# Patient Record
Sex: MALE | Race: WHITE | Employment: OTHER | URBAN - METROPOLITAN AREA
[De-identification: names, ages, dates, MRNs, and addresses within clinical notes are randomized per-mention and may not be internally consistent; named-entity substitution may affect disease eponyms.]

---

## 2017-02-09 PROBLEM — I48.91 ATRIAL FIBRILLATION (HCC): Status: ACTIVE | Noted: 2017-02-09

## 2017-02-09 PROBLEM — M62.830 BACK SPASM: Status: ACTIVE | Noted: 2017-02-09

## 2017-02-09 PROBLEM — B02.29 POST HERPETIC NEURALGIA: Status: ACTIVE | Noted: 2017-02-09

## 2017-02-10 ENCOUNTER — HOSPITAL ENCOUNTER (OUTPATIENT)
Age: 67
Discharge: HOME | End: 2017-02-10
Payer: MEDICARE

## 2017-02-10 DIAGNOSIS — Z11.4 SCREENING FOR HIV (HUMAN IMMUNODEFICIENCY VIRUS): ICD-10-CM

## 2017-02-10 DIAGNOSIS — E78.5 HYPERLIPIDEMIA WITH TARGET LDL LESS THAN 130: Chronic | ICD-10-CM

## 2017-02-10 DIAGNOSIS — Z11.59 NEED FOR HEPATITIS C SCREENING TEST: ICD-10-CM

## 2017-02-10 LAB
ALBUMIN SERPL-MCNC: 4.5 G/DL (ref 3.5–5.2)
ALBUMIN/GLOBULIN RATIO: ABNORMAL (ref 1–2.5)
ALP BLD-CCNC: 71 U/L (ref 40–129)
ALT SERPL-CCNC: 38 U/L (ref 5–41)
ANION GAP SERPL CALCULATED.3IONS-SCNC: 15 MMOL/L (ref 9–17)
AST SERPL-CCNC: 26 U/L
BILIRUB SERPL-MCNC: 1.11 MG/DL (ref 0.3–1.2)
BUN BLDV-MCNC: 17 MG/DL (ref 8–23)
BUN/CREAT BLD: ABNORMAL (ref 9–20)
CALCIUM SERPL-MCNC: 9.5 MG/DL (ref 8.6–10.4)
CHLORIDE BLD-SCNC: 103 MMOL/L (ref 98–107)
CHOLESTEROL/HDL RATIO: 5
CHOLESTEROL: 196 MG/DL
CO2: 24 MMOL/L (ref 20–31)
CREAT SERPL-MCNC: 0.98 MG/DL (ref 0.7–1.2)
GFR AFRICAN AMERICAN: >60 ML/MIN
GFR NON-AFRICAN AMERICAN: >60 ML/MIN
GFR SERPL CREATININE-BSD FRML MDRD: ABNORMAL ML/MIN/{1.73_M2}
GFR SERPL CREATININE-BSD FRML MDRD: ABNORMAL ML/MIN/{1.73_M2}
GLUCOSE BLD-MCNC: 122 MG/DL (ref 70–99)
HDLC SERPL-MCNC: 39 MG/DL
HIV AG/AB: NONREACTIVE
LDL CHOLESTEROL: 104 MG/DL (ref 0–130)
POTASSIUM SERPL-SCNC: 4.3 MMOL/L (ref 3.7–5.3)
SODIUM BLD-SCNC: 142 MMOL/L (ref 135–144)
TOTAL PROTEIN: 7.4 G/DL (ref 6.4–8.3)
TRIGL SERPL-MCNC: 266 MG/DL
VLDLC SERPL CALC-MCNC: ABNORMAL MG/DL (ref 1–30)

## 2017-02-10 PROCEDURE — 80061 LIPID PANEL: CPT

## 2017-02-10 PROCEDURE — 87389 HIV-1 AG W/HIV-1&-2 AB AG IA: CPT

## 2017-02-10 PROCEDURE — 86803 HEPATITIS C AB TEST: CPT

## 2017-02-10 PROCEDURE — 80053 COMPREHEN METABOLIC PANEL: CPT

## 2017-02-10 PROCEDURE — 36415 COLL VENOUS BLD VENIPUNCTURE: CPT

## 2017-02-15 LAB — HEPATITIS C ANTIBODY: NONREACTIVE

## 2017-08-09 PROBLEM — I48.91 ATRIAL FIBRILLATION (HCC): Chronic | Status: ACTIVE | Noted: 2017-02-09

## 2018-02-09 ENCOUNTER — HOSPITAL ENCOUNTER (OUTPATIENT)
Age: 68
Discharge: HOME | End: 2018-02-09
Payer: MEDICARE

## 2018-02-09 DIAGNOSIS — E78.5 HYPERLIPIDEMIA WITH TARGET LDL LESS THAN 130: Chronic | ICD-10-CM

## 2018-02-09 LAB
ALBUMIN SERPL-MCNC: 4.3 G/DL (ref 3.5–5.2)
ALBUMIN/GLOBULIN RATIO: ABNORMAL (ref 1–2.5)
ALP BLD-CCNC: 67 U/L (ref 40–129)
ALT SERPL-CCNC: 47 U/L (ref 5–41)
ANION GAP SERPL CALCULATED.3IONS-SCNC: 11 MMOL/L (ref 9–17)
AST SERPL-CCNC: 36 U/L
BILIRUB SERPL-MCNC: 1.09 MG/DL (ref 0.3–1.2)
BUN BLDV-MCNC: 14 MG/DL (ref 8–23)
BUN/CREAT BLD: ABNORMAL (ref 9–20)
CALCIUM SERPL-MCNC: 9.1 MG/DL (ref 8.6–10.4)
CHLORIDE BLD-SCNC: 102 MMOL/L (ref 98–107)
CHOLESTEROL/HDL RATIO: 6.1
CHOLESTEROL: 227 MG/DL
CO2: 27 MMOL/L (ref 20–31)
CREAT SERPL-MCNC: 0.84 MG/DL (ref 0.7–1.2)
GFR AFRICAN AMERICAN: >60 ML/MIN
GFR NON-AFRICAN AMERICAN: >60 ML/MIN
GFR SERPL CREATININE-BSD FRML MDRD: ABNORMAL ML/MIN/{1.73_M2}
GFR SERPL CREATININE-BSD FRML MDRD: ABNORMAL ML/MIN/{1.73_M2}
GLUCOSE BLD-MCNC: 108 MG/DL (ref 70–99)
HDLC SERPL-MCNC: 37 MG/DL
LDL CHOLESTEROL DIRECT: 125 MG/DL
LDL CHOLESTEROL: ABNORMAL MG/DL (ref 0–130)
POTASSIUM SERPL-SCNC: 4.2 MMOL/L (ref 3.7–5.3)
SODIUM BLD-SCNC: 140 MMOL/L (ref 135–144)
TOTAL PROTEIN: 7.1 G/DL (ref 6.4–8.3)
TRIGL SERPL-MCNC: 466 MG/DL
VLDLC SERPL CALC-MCNC: ABNORMAL MG/DL (ref 1–30)

## 2018-02-09 PROCEDURE — 83721 ASSAY OF BLOOD LIPOPROTEIN: CPT

## 2018-02-09 PROCEDURE — 36415 COLL VENOUS BLD VENIPUNCTURE: CPT

## 2018-02-09 PROCEDURE — 80053 COMPREHEN METABOLIC PANEL: CPT

## 2018-02-09 PROCEDURE — 80061 LIPID PANEL: CPT

## 2019-02-11 ENCOUNTER — HOSPITAL ENCOUNTER (OUTPATIENT)
Age: 69
Discharge: HOME | End: 2019-02-11
Payer: MEDICARE

## 2019-02-11 DIAGNOSIS — E78.5 HYPERLIPIDEMIA WITH TARGET LDL LESS THAN 130: Chronic | ICD-10-CM

## 2019-02-11 DIAGNOSIS — I48.91 ATRIAL FIBRILLATION, UNSPECIFIED TYPE (HCC): Chronic | ICD-10-CM

## 2019-02-11 LAB
ALBUMIN SERPL-MCNC: 4.6 G/DL (ref 3.5–5.2)
ALBUMIN/GLOBULIN RATIO: ABNORMAL (ref 1–2.5)
ALP BLD-CCNC: 71 U/L (ref 40–129)
ALT SERPL-CCNC: 24 U/L (ref 5–41)
ANION GAP SERPL CALCULATED.3IONS-SCNC: 11 MMOL/L (ref 9–17)
AST SERPL-CCNC: 21 U/L
BILIRUB SERPL-MCNC: 0.95 MG/DL (ref 0.3–1.2)
BUN BLDV-MCNC: 15 MG/DL (ref 8–23)
BUN/CREAT BLD: ABNORMAL (ref 9–20)
CALCIUM SERPL-MCNC: 9.6 MG/DL (ref 8.6–10.4)
CHLORIDE BLD-SCNC: 105 MMOL/L (ref 98–107)
CHOLESTEROL/HDL RATIO: 7.2
CHOLESTEROL: 215 MG/DL
CO2: 27 MMOL/L (ref 20–31)
CREAT SERPL-MCNC: 0.98 MG/DL (ref 0.7–1.2)
GFR AFRICAN AMERICAN: >60 ML/MIN
GFR NON-AFRICAN AMERICAN: >60 ML/MIN
GFR SERPL CREATININE-BSD FRML MDRD: ABNORMAL ML/MIN/{1.73_M2}
GFR SERPL CREATININE-BSD FRML MDRD: ABNORMAL ML/MIN/{1.73_M2}
GLUCOSE BLD-MCNC: 113 MG/DL (ref 70–99)
HDLC SERPL-MCNC: 30 MG/DL
LDL CHOLESTEROL DIRECT: 123 MG/DL
LDL CHOLESTEROL: ABNORMAL MG/DL (ref 0–130)
POTASSIUM SERPL-SCNC: 4.4 MMOL/L (ref 3.7–5.3)
SODIUM BLD-SCNC: 143 MMOL/L (ref 135–144)
TOTAL PROTEIN: 7.6 G/DL (ref 6.4–8.3)
TRIGL SERPL-MCNC: 508 MG/DL
VLDLC SERPL CALC-MCNC: ABNORMAL MG/DL (ref 1–30)

## 2019-02-11 PROCEDURE — 80061 LIPID PANEL: CPT

## 2019-02-11 PROCEDURE — 80053 COMPREHEN METABOLIC PANEL: CPT

## 2019-02-11 PROCEDURE — 36415 COLL VENOUS BLD VENIPUNCTURE: CPT

## 2019-02-11 PROCEDURE — 83721 ASSAY OF BLOOD LIPOPROTEIN: CPT

## 2020-02-12 PROBLEM — R94.31 ABNORMAL ELECTROCARDIOGRAM: Status: ACTIVE | Noted: 2017-02-16

## 2020-02-12 PROBLEM — I49.3 PVC'S (PREMATURE VENTRICULAR CONTRACTIONS): Status: ACTIVE | Noted: 2018-04-16

## 2020-02-12 PROBLEM — F33.9 MAJOR DEPRESSION, RECURRENT, CHRONIC (HCC): Status: ACTIVE | Noted: 2017-02-17

## 2020-02-12 PROBLEM — J32.9 CHRONIC SINUSITIS: Status: ACTIVE | Noted: 2018-10-16

## 2020-02-12 PROBLEM — I34.0 MILD MITRAL REGURGITATION: Status: ACTIVE | Noted: 2017-03-13

## 2020-02-12 PROBLEM — F10.11 HISTORY OF ALCOHOL ABUSE: Status: ACTIVE | Noted: 2019-04-23

## 2021-03-16 ENCOUNTER — OFFICE VISIT (OUTPATIENT)
Dept: UROLOGY | Age: 71
End: 2021-03-16
Payer: MEDICARE

## 2021-03-16 VITALS — HEART RATE: 86 BPM | TEMPERATURE: 97.7 F | SYSTOLIC BLOOD PRESSURE: 135 MMHG | DIASTOLIC BLOOD PRESSURE: 78 MMHG

## 2021-03-16 DIAGNOSIS — R97.20 ELEVATED PSA: Primary | ICD-10-CM

## 2021-03-16 PROCEDURE — 99203 OFFICE O/P NEW LOW 30 MIN: CPT | Performed by: UROLOGY

## 2021-03-16 ASSESSMENT — ENCOUNTER SYMPTOMS
BACK PAIN: 0
ABDOMINAL PAIN: 0
EYE REDNESS: 0
COUGH: 0
SHORTNESS OF BREATH: 0
WHEEZING: 0
VOMITING: 0
DIARRHEA: 0
EYE PAIN: 0
CONSTIPATION: 0
NAUSEA: 0

## 2021-03-16 NOTE — LETTER
[address] [address]  Dept: [phone]  Dept Fax: [phone]        3/16/21    Patient: Philip Soto  YOB: 1950    Dear Verner Camel, MD,    I had the pleasure of seeing one of your patients, Marilee Pan today in the office today. Below are the relevant portions of my assessment and plan of care. IMPRESSION:  1. Elevated PSA        PLAN:  He was found to have an elevated PSA recently. He thinks he may have had an infection at that time. We discussed biopsy, but he is reluctant. Will repeat PSA in 3 months. Prescriptions Ordered:  No orders of the defined types were placed in this encounter. Orders Placed:  Orders Placed This Encounter   Procedures    PSA, Diagnostic     Standing Status:   Future     Standing Expiration Date:   3/16/2022         Thank you for allowing me to participate in the care of this patient. I will keep you updated on this patient's follow up and I look forward to serving you and your patients again in the future.         Raudel Ellis MD

## 2021-03-16 NOTE — PROGRESS NOTES
[address] [address]  Dept: [phone]  Dept Fax: [phone] Urology Office Note - New patient    Patient:  Dawson Pereyra  YOB: 1950  Date: 3/16/2021    The patient is a 72 y.o. male who presents todayfor evaluation of the following problems:   Chief Complaint   Patient presents with    New Patient    Elevated PSA    referred by Donita Sanches MD.      HPI  He is here for elevated PSA. His most recent PSA was 5.67. He had some infections in the past, many years ago. No voiding complaints. He has not had an elevated PSA in the past. No FHx of prostate cancer. He did not have any dysuria. (Patient's old records have been requested, reviewed and summarized in today's note.)    Summary of old records: N/A    Additional History: N/A    Procedures Today: N/A    Last several PSA's:  No results found for: PSA  Last total testosterone:  No results found for: TESTOSTERONE  Urinalysis today:  No results found for this visit on 03/16/21. AUA Symptom Score (3/16/2021):   INCOMPLETE EMPTYING: How often have you had the sensation of not emptying your bladder?: Not at all  FREQUENCY: How often do you have to urinate less than every two hours?: Not at all  INTERMITTENCY: How often have you found you stopped and started again several times when you urinated?: Not at all  URGENCY: How often have you found it difficult to postpone urination?: Not at all  WEAK STREAM: How often have you had a weak urinary stream?: Not at all  STRAINING: How often have you had to strain to start  urination?: Not at all  NOCTURIA: How many times did you typically get up at night to uriniate?: NONE  TOTAL I-PSS SCORE[de-identified] 0  How would you feel if you were to spend the rest of your life with your urinary condition?: Pleased    Last BUN and creatinine:  Lab Results   Component Value Date    BUN 15 02/11/2019     Lab on file to calculate BMI. Physical Exam  Constitutional: Patient in no acute distress. Neuro: Alert and oriented to person, place and time. Psych: Mood normal, affect normal  Lungs:Respiratory effort is normal  Cardiovascular: Warm & Pink  Abdomen: Soft, non-tender, non-distendedwith no CVA,  No flank tenderness,  Orhepatosplenomegaly   Lymphatics: No palpable lymphadenopathy. Bladder non-tender and not distended. Musculoskeletal: Normal gait and station  Penis normal and circumcised  Urethral meatus normal  Scrotal exam normal  Testicles normal bilaterally  Epididymis normal bilaterally  No evidence of inguinal hernia  Normal rectal tone with no masses  Prostate:  40 grams, smooth, no nodules. Assessment and Plan      1. Elevated PSA           Plan:        He was found to have an elevated PSA recently. He thinks he may have had an infection at that time. We discussed biopsy, but he is reluctant. Will repeat PSA in 3 months. Prescriptions Ordered:  No orders of the defined types were placed in this encounter. Orders Placed:  Orders Placed This Encounter   Procedures    PSA, Diagnostic     Standing Status:   Future     Standing Expiration Date:   3/16/2022             Preeti Concepcion MD    Agree with the ROS entered by the MA.

## 2021-06-11 ENCOUNTER — HOSPITAL ENCOUNTER (OUTPATIENT)
Age: 71
Discharge: HOME | End: 2021-06-11
Payer: MEDICARE

## 2021-06-11 DIAGNOSIS — R97.20 ELEVATED PSA: ICD-10-CM

## 2021-06-11 LAB — PROSTATE SPECIFIC ANTIGEN: 4.9 UG/L

## 2021-06-11 PROCEDURE — 36415 COLL VENOUS BLD VENIPUNCTURE: CPT

## 2021-06-11 PROCEDURE — 84153 ASSAY OF PSA TOTAL: CPT

## 2021-06-15 ENCOUNTER — OFFICE VISIT (OUTPATIENT)
Dept: UROLOGY | Age: 71
End: 2021-06-15
Payer: MEDICARE

## 2021-06-15 VITALS
TEMPERATURE: 96.2 F | HEART RATE: 69 BPM | SYSTOLIC BLOOD PRESSURE: 152 MMHG | WEIGHT: 209 LBS | DIASTOLIC BLOOD PRESSURE: 98 MMHG | HEIGHT: 73 IN | BODY MASS INDEX: 27.7 KG/M2

## 2021-06-15 DIAGNOSIS — R35.1 NOCTURIA: ICD-10-CM

## 2021-06-15 DIAGNOSIS — R97.20 ELEVATED PSA: Primary | ICD-10-CM

## 2021-06-15 PROCEDURE — 99213 OFFICE O/P EST LOW 20 MIN: CPT | Performed by: UROLOGY

## 2021-06-15 ASSESSMENT — ENCOUNTER SYMPTOMS
EYE PAIN: 0
WHEEZING: 0
VOMITING: 0
SHORTNESS OF BREATH: 0
NAUSEA: 0
CONSTIPATION: 0
COUGH: 0
BACK PAIN: 0
ABDOMINAL PAIN: 0
EYE REDNESS: 0
DIARRHEA: 0

## 2021-06-15 NOTE — PROGRESS NOTES
Review of Systems   Constitutional: Negative for chills, fatigue and fever. Eyes: Negative for pain, redness and visual disturbance. Respiratory: Negative for cough, shortness of breath and wheezing. Cardiovascular: Negative for chest pain and leg swelling. Gastrointestinal: Negative for abdominal pain, constipation, diarrhea, nausea and vomiting. Genitourinary: Positive for frequency. Negative for difficulty urinating, dysuria, flank pain, hematuria, scrotal swelling, testicular pain and urgency. Musculoskeletal: Negative for back pain, joint swelling and myalgias. Skin: Negative for rash and wound. Neurological: Negative for dizziness, tremors and numbness. Hematological: Does not bruise/bleed easily.

## 2021-06-15 NOTE — PROGRESS NOTES
[address] [address]  Dept:  [facility] Urology Office Note - Established    Patient:  Hi Corado  YOB: 1950  Date: 6/15/2021    The patient is a 72 y.o. male who presents todayfor evaluation of the following problems:   Chief Complaint   Patient presents with    Elevated PSA     PSA results    3 Month Follow-Up       HPI  He is here for elevated PSA. He was found to have a PSA of 5.67. He was reluctant to undergo a biopsy at that time. We repeated the PSA, which came down to 4.9. He thought he had an infection at that time. He had not had a PSA for some time. He has no  symptoms at this time. Overall, he is happy with his voiding. Summary of old records: N/A    Additional History: N/A    Procedures Today: N/A    Urinalysis today:  No results found for this visit on 06/15/21. Last several PSA's:  Lab Results   Component Value Date    PSA 4.90 (H) 06/11/2021     Last total testosterone:  No results found for: TESTOSTERONE    AUA Symptom Score (6/15/2021):   INCOMPLETE EMPTYING: How often have you had the sensation of not emptying your bladder?: Not at all  FREQUENCY: How often do you have to urinate less than every two hours?: Not at all  INTERMITTENCY: How often have you found you stopped and started again several times when you urinated?: Almost always  URGENCY: How often have you found it difficult to postpone urination?: Not at all  WEAK STREAM: How often have you had a weak urinary stream?: About Half the time  STRAINING: How often have you had to strain to start  urination?: Not at all  NOCTURIA: How many times did you typically get up at night to uriniate?: 2 Times  TOTAL I-PSS SCORE[de-identified] 10  How would you feel if you were to spend the rest of your life with your urinary condition?: Terrible    Last BUN and creatinine:  Lab Results   Component Value Date    BUN 15 kg/m². Patient is a 72 y.o. male in no acute distress and alert and oriented to person, place and time. Physical Exam  Constitutional: Patient in no acute distress. Neuro: Alert and oriented to person, place and time. Psych: Mood normal, affect normal  Lungs: Respiratory effort is normal  Cardiovascular: Warm & Pink  Abdomen: Soft, non-tender, non-distended with no CVA,  No flank tenderness,  Or hepatosplenomegaly   Lymphatics: No palpablelymphadenopathy. Bladder non-tender and not distended. Musculoskeletal: Normal gait and station      Assessment and Plan      1. Elevated PSA    2. Nocturia           Plan:          His PSA remains elevated, but has improved. He was reluctant to undergo a biopsy. Will follow up in 6 months with a PSA. Restrict fluids before bed. Return in about 6 months (around 12/15/2021) for Labs. Prescriptions Ordered:  No orders of the defined types were placed in this encounter. Orders Placed:  Orders Placed This Encounter   Procedures    PSA, Diagnostic     Standing Status:   Future     Standing Expiration Date:   6/15/2022           Muna Moreno MD    Agree with the ROS entered by the MA.

## 2021-06-15 NOTE — LETTER
[address] [address]  Dept: [phone]  Dept Fax: [phone]        6/15/21    Patient: Florina Alvarado  YOB: 1950    Dear Asiya Jorge MD,    I had the pleasure of seeing one of your patients, Duane Trejo today in the office today. Below are the relevant portions of my assessment and plan of care. IMPRESSION:  1. Elevated PSA    2. Nocturia        PLAN:  His PSA remains elevated, but has improved. He was reluctant to undergo a biopsy. Will follow up in 6 months with a PSA. Restrict fluids before bed. Return in about 6 months (around 12/15/2021) for Labs. Prescriptions Ordered:  No orders of the defined types were placed in this encounter. Orders Placed:  Orders Placed This Encounter   Procedures    PSA, Diagnostic     Standing Status:   Future     Standing Expiration Date:   6/15/2022        Thank you for allowing me to participate in the care of this patient. I will keep you updated on this patient's follow up and I look forward to serving you and your patients again in the future.         Bonnie Stokes MD

## 2021-12-10 ENCOUNTER — HOSPITAL ENCOUNTER (OUTPATIENT)
Age: 71
Discharge: HOME | End: 2021-12-10
Payer: MEDICARE

## 2021-12-10 DIAGNOSIS — R97.20 ELEVATED PSA: ICD-10-CM

## 2021-12-10 PROCEDURE — 84153 ASSAY OF PSA TOTAL: CPT

## 2021-12-10 PROCEDURE — 36415 COLL VENOUS BLD VENIPUNCTURE: CPT

## 2021-12-11 LAB — PROSTATE SPECIFIC ANTIGEN: 5.28 UG/L

## 2021-12-14 ENCOUNTER — OFFICE VISIT (OUTPATIENT)
Dept: UROLOGY | Age: 71
End: 2021-12-14
Payer: MEDICARE

## 2021-12-14 VITALS
DIASTOLIC BLOOD PRESSURE: 90 MMHG | WEIGHT: 199 LBS | SYSTOLIC BLOOD PRESSURE: 142 MMHG | BODY MASS INDEX: 26.37 KG/M2 | HEIGHT: 73 IN | TEMPERATURE: 96.8 F

## 2021-12-14 DIAGNOSIS — R97.20 ELEVATED PSA: Primary | ICD-10-CM

## 2021-12-14 DIAGNOSIS — R35.1 NOCTURIA: ICD-10-CM

## 2021-12-14 PROCEDURE — 99213 OFFICE O/P EST LOW 20 MIN: CPT | Performed by: UROLOGY

## 2021-12-14 ASSESSMENT — ENCOUNTER SYMPTOMS
VOMITING: 0
EYE PAIN: 0
DIARRHEA: 0
ABDOMINAL PAIN: 0
EYE REDNESS: 0
SHORTNESS OF BREATH: 0
COUGH: 0
CONSTIPATION: 0
WHEEZING: 0
BACK PAIN: 0
NAUSEA: 1

## 2021-12-14 NOTE — LETTER
[address] [address]  Dept: [phone]  Dept Fax: [phone]        12/14/21    Patient: Chanda Pretty  YOB: 1950    Dear Flor Alicea MD,    I had the pleasure of seeing one of your patients, Tim Lal today in the office today. Below are the relevant portions of my assessment and plan of care. IMPRESSION:  1. Elevated PSA    2. Nocturia        PLAN:  His PSA was 5.67, and came down, but is now back up. We discussed all of his options. He is not interested in biopsy at this time. We discussed MRI as well. For now, we will move forward with urine ExoDx. Return in about 6 weeks (around 1/25/2022). Prescriptions Ordered:  No orders of the defined types were placed in this encounter. Orders Placed:  No orders of the defined types were placed in this encounter. Thank you for allowing me to participate in the care of this patient. I will keep you updated on this patient's follow up and I look forward to serving you and your patients again in the future.         Juan Alaniz MD

## 2021-12-14 NOTE — PROGRESS NOTES
[address] [address]  Dept: [address] [facility] Urology Office Note - Established    Patient:  Moy Vazquez  YOB: 1950  Date: 12/14/2021    The patient is a 72 y.o. male who presents todayfor evaluation of the following problems:   Chief Complaint   Patient presents with    Follow-up     6 month PSA        HPI  Here for f/u elevated PSA. Found to have elevated psa in February 2021 was 5.67. Repeated in June and came back down to 4.9. PSA is back up to 5.28. He has never had a prostate biopsy. No fhx of prostate CA. He denies voiding complaints. His stream is strong and feels he empties well. No urgency or frequency. He denies hematuria or dysuria. Summary of old records: N/A    Additional History: N/A    Procedures Today: N/A    Urinalysis today:  No results found for this visit on 12/14/21. Last several PSA's:  Lab Results   Component Value Date    PSA 5.28 (H) 12/10/2021    PSA 4.90 (H) 06/11/2021     Last total testosterone:  No results found for: TESTOSTERONE    AUA Symptom Score (12/14/2021):   INCOMPLETE EMPTYING: How often have you had the sensation of not emptying your bladder?: Not at all  FREQUENCY: How often do you have to urinate less than every two hours?: Not at all  INTERMITTENCY: How often have you found you stopped and started again several times when you urinated?: Not at all  URGENCY: How often have you found it difficult to postpone urination?: Not at all  WEAK STREAM: How often have you had a weak urinary stream?: Not at all  STRAINING: How often have you had to strain to start  urination?: Not at all  NOCTURIA: How many times did you typically get up at night to uriniate?: 2 Times  TOTAL I-PSS SCORE[de-identified] 2  How would you feel if you were to spend the rest of your life with your urinary condition?: Delighted    Last BUN and creatinine:  Lab Results   Component Value Date    BUN 15 02/11/2019     Lab Results   Component Value Date    CREATININE 0.98 02/11/2019       Additional Lab/Culture results: none    Imaging Reviewed during this Office Visit: none  (results were independently reviewed by physician and radiology report verified)    PAST MEDICAL, FAMILY AND SOCIAL HISTORY UPDATE:  Past Medical History:   Diagnosis Date    Alcohol abuse 2/9/2016    BMI 26.0-26.9,adult 8/6/2015    Diverticulosis     Hyperlipidemia LDL goal < 130 8/6/2015    Mixed hyperlipidemia 8/6/2015    replace inactive diagnosis    Overweight (BMI 25.0-29. 9) 8/6/2015    Sinusitis      Past Surgical History:   Procedure Laterality Date    COLONOSCOPY      10 YEARS AGO    HERNIA REPAIR      RIGHT INGUINAL     History reviewed. No pertinent family history.   Outpatient Medications Marked as Taking for the 12/14/21 encounter (Office Visit) with Kiki Pena MD   Medication Sig Dispense Refill    atorvastatin (LIPITOR) 80 MG tablet Take 0.5 tablets by mouth nightly 90 tablet 3    escitalopram (LEXAPRO) 10 MG tablet TAKE 1 TABLET DAILY 90 tablet 3    Omega-3 Fatty Acids (FISH OIL) 1000 MG CAPS Take 4 capsules by mouth daily 120 capsule 11    fluticasone (FLONASE) 50 MCG/ACT nasal spray USE 1 SPRAY NASALLY DAILY 48 g 3    Multiple Vitamins-Minerals (CENTRUM MEN PO) Take by mouth daily      apixaban (ELIQUIS) 5 MG TABS tablet Take by mouth 2 times daily      metoprolol tartrate (LOPRESSOR) 50 MG tablet Take 50 mg by mouth 2 times daily         Sulfa antibiotics  Social History     Tobacco Use   Smoking Status Never Smoker   Smokeless Tobacco Never Used     (Ifpatient a smoker, smoking cessation counseling offered)    Social History     Substance and Sexual Activity   Alcohol Use No    Alcohol/week: 0.0 standard drinks    Comment: Stopped drinking Dec. 10th 2016       REVIEW OF SYSTEMS:  Review of Systems    Physical Exam:      Vitals:    12/14/21 1049   BP: (!) 142/90   Temp: 96.8 °F (36 °C) Body mass index is 26.25 kg/m². Patient is a 72 y.o. male in no acute distress and alert and oriented to person, place and time. Physical Exam  Constitutional: Patient in no acute distress. Neuro: Alert and oriented to person, place and time. Psych: Mood normal, affect normal  Lungs: Respiratory effort is normal  Cardiovascular: Warm & Pink  Abdomen: Soft, non-tender, non-distended with no CVA,  No flank tenderness,  Or hepatosplenomegaly   Lymphatics: No palpablelymphadenopathy. Bladder non-tender and not distended. Musculoskeletal: Normal gait and station      Assessment and Plan      1. Elevated PSA    2. Nocturia           Plan:          His PSA was 5.67, and came down, but is now back up. We discussed all of his options. He is not interested in biopsy at this time. We discussed MRI as well. For now, we will move forward with urine ExoDx. Return in about 6 weeks (around 1/25/2022). Prescriptions Ordered:  No orders of the defined types were placed in this encounter. Orders Placed:  No orders of the defined types were placed in this encounter. Juan Alaniz MD    Agree with the ROS entered by the MA.

## 2021-12-14 NOTE — PROGRESS NOTES
Review of Systems   Constitutional: Negative for appetite change, chills and fatigue. Eyes: Negative for pain, redness and visual disturbance. Respiratory: Negative for cough, shortness of breath and wheezing. Cardiovascular: Negative for chest pain and leg swelling. Gastrointestinal: Positive for nausea. Negative for abdominal pain, constipation, diarrhea and vomiting. Genitourinary: Negative for difficulty urinating, dysuria, flank pain, frequency, hematuria and urgency. Musculoskeletal: Negative for back pain, joint swelling and myalgias. Skin: Negative for rash and wound. Neurological: Negative for dizziness, weakness and numbness. Hematological: Bruises/bleeds easily.

## 2022-01-25 ENCOUNTER — TELEPHONE (OUTPATIENT)
Dept: UROLOGY | Age: 72
End: 2022-01-25

## 2022-01-25 ENCOUNTER — HOSPITAL ENCOUNTER (OUTPATIENT)
Age: 72
Setting detail: SPECIMEN
Discharge: HOME | End: 2022-01-25

## 2022-01-25 ENCOUNTER — OFFICE VISIT (OUTPATIENT)
Dept: UROLOGY | Age: 72
End: 2022-01-25
Payer: MEDICARE

## 2022-01-25 VITALS
BODY MASS INDEX: 26.37 KG/M2 | HEIGHT: 73 IN | TEMPERATURE: 95.4 F | SYSTOLIC BLOOD PRESSURE: 130 MMHG | WEIGHT: 199 LBS | DIASTOLIC BLOOD PRESSURE: 80 MMHG

## 2022-01-25 DIAGNOSIS — R35.1 NOCTURIA: ICD-10-CM

## 2022-01-25 DIAGNOSIS — R97.20 ELEVATED PSA: Primary | ICD-10-CM

## 2022-01-25 LAB
FLUOROQUINOLONE RESISTANT ORG, CULT: NORMAL
ZZ NTE WITH NAME CLEAN UP: SPECIMEN SOURCE: NORMAL

## 2022-01-25 PROCEDURE — 99214 OFFICE O/P EST MOD 30 MIN: CPT | Performed by: UROLOGY

## 2022-01-25 ASSESSMENT — ENCOUNTER SYMPTOMS
EYE PAIN: 0
VOMITING: 0
SHORTNESS OF BREATH: 0
EYE REDNESS: 0
BACK PAIN: 1
NAUSEA: 0
ABDOMINAL PAIN: 0
COUGH: 0
WHEEZING: 0
CONSTIPATION: 1
DIARRHEA: 0

## 2022-01-25 NOTE — LETTER
[address] [address]  Dept: [phone]  Dept Fax: [phone]        1/25/22    Patient: Juan C Rodriguez  YOB: 1950    Dear Abdias Blackman MD,    I had the pleasure of seeing one of your patients, Carlos Pederson today in the office today. Below are the relevant portions of my assessment and plan of care. IMPRESSION:  1. Elevated PSA    2. Nocturia        PLAN:  PSA is elevated. ExoDx is slightly elevated. We discussed MRI, but he is a bit claustrophobic. Will move forward with prostate biopsy. Return in about 4 weeks (around 2/22/2022) for biopsy. Prescriptions Ordered:  No orders of the defined types were placed in this encounter. Orders Placed:  No orders of the defined types were placed in this encounter. Thank you for allowing me to participate in the care of this patient. I will keep you updated on this patient's follow up and I look forward to serving you and your patients again in the future.         Queta Mcknight MD

## 2022-01-25 NOTE — PROGRESS NOTES
[address] [facility] [address]  Dept:  [facility] Urology Office Note - Established    Patient:  Chad Peters  YOB: 1950  Date: 1/25/2022    The patient is a 80 y.o. male who presents todayfor evaluation of the following problems:   Chief Complaint   Patient presents with    Follow-up     6 week ExoDx       HPI  He is here in follow up for elevated PSA. He had an ExoDx, which was slightly elevated at 23. His PSA had been trending up. He feels like he has shingles at the moment. Summary of old records: N/A    Additional History: N/A    Procedures Today: N/A    Urinalysis today:  No results found for this visit on 01/25/22. Last several PSA's:  Lab Results   Component Value Date    PSA 5.28 (H) 12/10/2021    PSA 4.90 (H) 06/11/2021     Last total testosterone:  No results found for: TESTOSTERONE    AUA Symptom Score (1/25/2022):   INCOMPLETE EMPTYING: How often have you had the sensation of not emptying your bladder?: Not at all  FREQUENCY: How often do you have to urinate less than every two hours?: Not at all  INTERMITTENCY: How often have you found you stopped and started again several times when you urinated?: Less than 1 to 5 times  URGENCY: How often have you found it difficult to postpone urination?: Not at all  WEAK STREAM: How often have you had a weak urinary stream?: More than Half the time  STRAINING: How often have you had to strain to start  urination?: Not at all  NOCTURIA: How many times did you typically get up at night to uriniate?: 2 Times  TOTAL I-PSS SCORE[de-identified] 7  How would you feel if you were to spend the rest of your life with your urinary condition?: Pleased    Last BUN and creatinine:  Lab Results   Component Value Date    BUN 15 02/11/2019     Lab Results   Component Value Date    CREATININE 0.98 02/11/2019       Additional Lab/Culture results: none    Imaging Reviewed during this Office Visit: none    (results were independently reviewed by physician and radiology report verified)    PAST MEDICAL, FAMILY AND SOCIAL HISTORY UPDATE:  Past Medical History:   Diagnosis Date    Alcohol abuse 2/9/2016    BMI 26.0-26.9,adult 8/6/2015    Diverticulosis     Hyperlipidemia LDL goal < 130 8/6/2015    Mixed hyperlipidemia 8/6/2015    replace inactive diagnosis    Overweight (BMI 25.0-29. 9) 8/6/2015    Sinusitis      Past Surgical History:   Procedure Laterality Date    COLONOSCOPY      10 YEARS AGO    HERNIA REPAIR      RIGHT INGUINAL     No family history on file. Outpatient Medications Marked as Taking for the 1/25/22 encounter (Office Visit) with Cristóbal Webb MD   Medication Sig Dispense Refill    atorvastatin (LIPITOR) 80 MG tablet Take 0.5 tablets by mouth nightly 90 tablet 3    escitalopram (LEXAPRO) 10 MG tablet TAKE 1 TABLET DAILY 90 tablet 3    Omega-3 Fatty Acids (FISH OIL) 1000 MG CAPS Take 4 capsules by mouth daily 120 capsule 11    fluticasone (FLONASE) 50 MCG/ACT nasal spray USE 1 SPRAY NASALLY DAILY 48 g 3    Multiple Vitamins-Minerals (CENTRUM MEN PO) Take by mouth daily      apixaban (ELIQUIS) 5 MG TABS tablet Take by mouth 2 times daily      metoprolol tartrate (LOPRESSOR) 50 MG tablet Take 50 mg by mouth 2 times daily         Sulfa antibiotics  Social History     Tobacco Use   Smoking Status Never Smoker   Smokeless Tobacco Never Used     (Ifpatient a smoker, smoking cessation counseling offered)    Social History     Substance and Sexual Activity   Alcohol Use No    Alcohol/week: 0.0 standard drinks    Comment: Stopped drinking Dec. 10th 2016       REVIEW OF SYSTEMS:  Review of Systems    Physical Exam:      Vitals:    01/25/22 1117   BP: 130/80   Temp: 95.4 °F (35.2 °C)     Body mass index is 26.25 kg/m².   Patient is a 80 y.o. male in no acute distress and alert and oriented to person, place and time.  Physical Exam  Constitutional: Patient in no acute distress. Neuro: Alert and oriented to person, place and time. Psych: Mood normal, affect normal  Lungs: Respiratory effort is normal  Cardiovascular: Warm & Pink  Abdomen: Soft, non-tender, non-distended with no CVA,  No flank tenderness,  Or hepatosplenomegaly   Lymphatics: No palpablelymphadenopathy. Bladder non-tender and not distended. Musculoskeletal: Normal gait and station      Assessment and Plan      1. Elevated PSA    2. Nocturia           Plan:          PSA is elevated. ExoDx is slightly elevated. We discussed MRI, but he is a bit claustrophobic. Will move forward with prostate biopsy. Return in about 4 weeks (around 2/22/2022) for biopsy. Prescriptions Ordered:  No orders of the defined types were placed in this encounter. Orders Placed:  No orders of the defined types were placed in this encounter. Lili Turner MD    Agree with the ROS entered by the MA.

## 2022-01-25 NOTE — TELEPHONE ENCOUNTER
Patient is scheduled for prostate BX on 02/15. PA is needed. Patient states that he is also on Blood thinners.

## 2022-01-25 NOTE — PROGRESS NOTES
Review of Systems   Constitutional: Negative for chills, fatigue and fever. Eyes: Negative for pain, redness and visual disturbance. Respiratory: Negative for cough, shortness of breath and wheezing. Cardiovascular: Negative for chest pain and leg swelling. Gastrointestinal: Positive for constipation. Negative for abdominal pain, diarrhea, nausea and vomiting. Genitourinary: Negative for difficulty urinating, dysuria, flank pain, frequency, hematuria and urgency. Musculoskeletal: Positive for back pain. Negative for joint swelling and myalgias. Skin: Positive for rash. Negative for wound. Neurological: Negative for dizziness, tremors and numbness. Hematological: Does not bruise/bleed easily. Review of Systems PT has Catheter   Constitutional: Negative for chills, fatigue and fever. Eyes: Negative for pain, redness and visual disturbance. Respiratory: Negative for cough, shortness of breath and wheezing. Cardiovascular: Negative for chest pain and leg swelling. Gastrointestinal: Negative for abdominal pain, constipation, diarrhea, nausea and vomiting. Genitourinary: Negative for difficulty urinating, dysuria, flank pain, frequency, hematuria, scrotal swelling, testicular pain and urgency. Musculoskeletal: Negative for back pain, joint swelling and myalgias. Skin: Negative for rash and wound. Neurological: Negative for dizziness, tremors and numbness. Hematological: Does not bruise/bleed easily.

## 2022-01-26 DIAGNOSIS — R97.20 ELEVATED PSA: ICD-10-CM

## 2022-01-31 RX ORDER — CIPROFLOXACIN 500 MG/1
500 TABLET, FILM COATED ORAL 2 TIMES DAILY
Qty: 6 TABLET | Refills: 0 | Status: SHIPPED | OUTPATIENT
Start: 2022-01-31 | End: 2022-02-03

## 2022-01-31 NOTE — PROGRESS NOTES
Per Dr. Seema Bonilla protocol.  Cipro 500 mg BID for three days starting the day before procedure sent to the . Adiel Henning was notified and pharmacy was confirmed.

## 2022-02-02 ENCOUNTER — TELEPHONE (OUTPATIENT)
Dept: UROLOGY | Age: 72
End: 2022-02-02

## 2022-02-15 ENCOUNTER — HOSPITAL ENCOUNTER (OUTPATIENT)
Age: 72
Setting detail: SPECIMEN
Discharge: HOME | End: 2022-02-15

## 2022-02-15 ENCOUNTER — PROCEDURE VISIT (OUTPATIENT)
Dept: UROLOGY | Age: 72
End: 2022-02-15
Payer: MEDICARE

## 2022-02-15 VITALS — HEIGHT: 73 IN | TEMPERATURE: 95.8 F | BODY MASS INDEX: 26.37 KG/M2 | WEIGHT: 199 LBS

## 2022-02-15 DIAGNOSIS — R97.20 ELEVATED PSA: Primary | ICD-10-CM

## 2022-02-15 PROCEDURE — 76872 US TRANSRECTAL: CPT | Performed by: UROLOGY

## 2022-02-15 PROCEDURE — 55700 PR BIOPSY OF PROSTATE,NEEDLE/PUNCH: CPT | Performed by: UROLOGY

## 2022-02-15 NOTE — PROGRESS NOTES
Elevated PSA:  Patient is here today for history of an elevated PSA. PROSTATE U/S AND BIOPSY  Risks and Benefits discussed with patient prior to procedure. As per my instructions, the patient took an antibiotic 1 hour prior to this procedure. He also had a Fleets enema. Surgeon: Maxwell Yoo 2/15/22  Indications for exam: Elevated PSA  Anesthesia: 1% Lidocaine periprostatic block bilaterally at junction of base of prostate and seminal vesicle. Ultrasound Findings:  Seminal Vesicles: intact bilaterally  Prostate Measurement: 36 grams  Central Zone: Normal echogenic structure  Transitional Zone: Normal echogenic structure  Peripheral Zone: Normal echogenic structure  Bladder: Minimal postvoid residual  Biopsy: Ultrasound guidance used to obtain biopsy cores were taken from each lobe in a sequential fashion. From 6 quadrants all were taken from the right 6 quadrants were taken from the left. All specimens were sent for pathological examination. Biopsy is transrectal with transrectal US done    Postop medications: Cipro 500 mg po bid for 3 more days. Recommendations: Patient has appointment in the office to review results. Postop Prostate Biopsy Instructions:  Patient told to drink plenty of fluids and to take it easy the day of the prostate biopsy. He was encouraged to use sitz baths as needed for relief of rectal discomfort after the biopsy. He was told he may notice blood or a rust color in his semen for several months after the biopsy. He was told to avoid resuming blood thinners or aspirin until the rectal bleeding or visible blood in urine has stopped for at least 48 hours. He should take his antibiotics to completion as prescribed. He was instructed to call our office immediately or to go to the Emergency Room if he experiences a fever over 101, shaking chills or an inability to urinate. Agree with the ROS entered by the MA.

## 2022-02-17 LAB — SURGICAL PATHOLOGY REPORT: NORMAL

## 2022-02-22 ENCOUNTER — OFFICE VISIT (OUTPATIENT)
Dept: UROLOGY | Age: 72
End: 2022-02-22
Payer: MEDICARE

## 2022-02-22 VITALS
TEMPERATURE: 96.3 F | RESPIRATION RATE: 17 BRPM | HEIGHT: 73 IN | DIASTOLIC BLOOD PRESSURE: 68 MMHG | BODY MASS INDEX: 26.77 KG/M2 | WEIGHT: 202 LBS | SYSTOLIC BLOOD PRESSURE: 122 MMHG | HEART RATE: 65 BPM

## 2022-02-22 DIAGNOSIS — R97.20 ELEVATED PSA: Primary | ICD-10-CM

## 2022-02-22 DIAGNOSIS — C61 PROSTATE CANCER (HCC): ICD-10-CM

## 2022-02-22 PROCEDURE — 99214 OFFICE O/P EST MOD 30 MIN: CPT | Performed by: UROLOGY

## 2022-02-22 ASSESSMENT — ENCOUNTER SYMPTOMS
NAUSEA: 0
SHORTNESS OF BREATH: 0
VOMITING: 0
COUGH: 0
DIARRHEA: 0
WHEEZING: 0
ABDOMINAL PAIN: 0
EYE PAIN: 0
BACK PAIN: 0
CONSTIPATION: 0

## 2022-02-22 NOTE — LETTER
[address] [address]  Dept: [phone]  Dept Fax: [phone]        2/22/22    Patient: John Aguilar  YOB: 1950    Dear Allie Sun MD,    I had the pleasure of seeing one of your patients, Jackson Aceves today in the office today. Below are the relevant portions of my assessment and plan of care. IMPRESSION:  1. Elevated PSA    2. Prostate cancer [facility])        PLAN:  He did well after his biopsy  Path was consistent with [place] 7 and 8 disease. Will move forward with CT and bone scan to rule out met disease. We discussed surgery and XRT. He is interested in XRT. Will discuss again after met eval is complete. Return in about 2 weeks (around 3/8/2022). Prescriptions Ordered:  No orders of the defined types were placed in this encounter. Orders Placed:  Orders Placed This Encounter   Procedures    NM BONE SCAN WHOLE BODY     Standing Status:   Future     Standing Expiration Date:   2/23/2023    CT ABDOMEN PELVIS W IV CONTRAST Additional Contrast? None     Standing Status:   Future     Standing Expiration Date:   2/22/2023     Order Specific Question:   Additional Contrast?     Answer:   None     Order Specific Question:   STAT Creatinine as needed:     Answer:   No        Thank you for allowing me to participate in the care of this patient. I will keep you updated on this patient's follow up and I look forward to serving you and your patients again in the future.         Cristóbal Webb MD

## 2022-02-28 ENCOUNTER — HOSPITAL ENCOUNTER (OUTPATIENT)
Dept: NUCLEAR MEDICINE | Age: 72
Discharge: HOME | End: 2022-03-02
Payer: MEDICARE

## 2022-02-28 ENCOUNTER — HOSPITAL ENCOUNTER (OUTPATIENT)
Dept: CT IMAGING | Age: 72
Discharge: HOME | End: 2022-03-02
Payer: MEDICARE

## 2022-02-28 DIAGNOSIS — C61 PROSTATE CANCER (HCC): ICD-10-CM

## 2022-02-28 LAB
GFR NON-AFRICAN AMERICAN: >60 ML/MIN
GFR SERPL CREATININE-BSD FRML MDRD: >60 ML/MIN
GFR SERPL CREATININE-BSD FRML MDRD: NORMAL ML/MIN/{1.73_M2}
POC CREATININE: 1.03 MG/DL (ref 0.51–1.19)

## 2022-02-28 PROCEDURE — 2580000003 HC RX 258: Performed by: UROLOGY

## 2022-02-28 PROCEDURE — 82565 ASSAY OF CREATININE: CPT

## 2022-02-28 PROCEDURE — A9503 TC99M MEDRONATE: HCPCS | Performed by: UROLOGY

## 2022-02-28 PROCEDURE — 6360000004 HC RX CONTRAST MEDICATION: Performed by: UROLOGY

## 2022-02-28 PROCEDURE — 3430000000 HC RX DIAGNOSTIC RADIOPHARMACEUTICAL: Performed by: UROLOGY

## 2022-02-28 PROCEDURE — 78306 BONE IMAGING WHOLE BODY: CPT

## 2022-02-28 PROCEDURE — 74177 CT ABD & PELVIS W/CONTRAST: CPT

## 2022-02-28 RX ORDER — TC 99M MEDRONATE 20 MG/10ML
25 INJECTION, POWDER, LYOPHILIZED, FOR SOLUTION INTRAVENOUS
Status: COMPLETED | OUTPATIENT
Start: 2022-02-28 | End: 2022-02-28

## 2022-02-28 RX ORDER — 0.9 % SODIUM CHLORIDE 0.9 %
80 INTRAVENOUS SOLUTION INTRAVENOUS ONCE
Status: COMPLETED | OUTPATIENT
Start: 2022-02-28 | End: 2022-02-28

## 2022-02-28 RX ORDER — SODIUM CHLORIDE 0.9 % (FLUSH) 0.9 %
10 SYRINGE (ML) INJECTION PRN
Status: DISCONTINUED | OUTPATIENT
Start: 2022-02-28 | End: 2022-03-03 | Stop reason: HOSPADM

## 2022-02-28 RX ADMIN — TC 99M MEDRONATE 28.2 MILLICURIE: 20 INJECTION, POWDER, LYOPHILIZED, FOR SOLUTION INTRAVENOUS at 11:05

## 2022-02-28 RX ADMIN — IOPAMIDOL 75 ML: 755 INJECTION, SOLUTION INTRAVENOUS at 11:26

## 2022-02-28 RX ADMIN — SODIUM CHLORIDE 80 ML: 9 INJECTION, SOLUTION INTRAVENOUS at 11:26

## 2022-02-28 RX ADMIN — Medication 10 ML: at 11:05

## 2022-02-28 RX ADMIN — SODIUM CHLORIDE, PRESERVATIVE FREE 10 ML: 5 INJECTION INTRAVENOUS at 11:27

## 2022-03-08 ENCOUNTER — OFFICE VISIT (OUTPATIENT)
Dept: UROLOGY | Age: 72
End: 2022-03-08
Payer: MEDICARE

## 2022-03-08 VITALS
DIASTOLIC BLOOD PRESSURE: 74 MMHG | TEMPERATURE: 95.3 F | HEIGHT: 73 IN | WEIGHT: 202 LBS | BODY MASS INDEX: 26.77 KG/M2 | HEART RATE: 65 BPM | SYSTOLIC BLOOD PRESSURE: 132 MMHG

## 2022-03-08 DIAGNOSIS — C61 PROSTATE CANCER (HCC): Primary | ICD-10-CM

## 2022-03-08 DIAGNOSIS — R97.20 ELEVATED PSA: Primary | ICD-10-CM

## 2022-03-08 DIAGNOSIS — C61 PROSTATE CANCER (HCC): ICD-10-CM

## 2022-03-08 PROCEDURE — 99214 OFFICE O/P EST MOD 30 MIN: CPT | Performed by: UROLOGY

## 2022-03-08 ASSESSMENT — ENCOUNTER SYMPTOMS
RESPIRATORY NEGATIVE: 1
EYE REDNESS: 0
GASTROINTESTINAL NEGATIVE: 1
DIARRHEA: 0
NAUSEA: 0
SHORTNESS OF BREATH: 0
ABDOMINAL PAIN: 0
EYES NEGATIVE: 1
CONSTIPATION: 0
COUGH: 0
EYE PAIN: 0
BACK PAIN: 0
VOMITING: 0
WHEEZING: 0

## 2022-03-08 NOTE — LETTER
[address] [address]  Dept: [phone]  Dept Fax: [phone]        3/8/22    Patient: Kaylee Hernandez  YOB: 1950    Dear Kmi Corley MD,    I had the pleasure of seeing one of your patients, Drake Dillon today in the office today. Below are the relevant portions of my assessment and plan of care. IMPRESSION:  1. Elevated PSA    2. Prostate cancer [facility])        PLAN:  He is here in follow up for prostate cancer. He has high risk disease. Met eval is negative. Would recommend referral to rad/Onc. No follow-ups on file. Prescriptions Ordered:  No orders of the defined types were placed in this encounter. Orders Placed:  No orders of the defined types were placed in this encounter. Thank you for allowing me to participate in the care of this patient. I will keep you updated on this patient's follow up and I look forward to serving you and your patients again in the future.         Moreno Tipton MD

## 2022-03-08 NOTE — PROGRESS NOTES
Review of Systems   Constitutional: Negative. Negative for appetite change, chills and fatigue. Eyes: Negative. Negative for pain, redness and visual disturbance. Respiratory: Negative. Negative for cough, shortness of breath and wheezing. Cardiovascular: Negative. Negative for chest pain and leg swelling. Gastrointestinal: Negative. Negative for abdominal pain, constipation, diarrhea, nausea and vomiting. Genitourinary: Positive for hematuria. Negative for difficulty urinating, dysuria, flank pain, frequency and urgency. Musculoskeletal: Negative. Negative for back pain, joint swelling and myalgias. Skin: Negative. Negative for rash and wound. Neurological: Negative. Negative for dizziness, weakness and numbness. Hematological: Negative. Does not bruise/bleed easily.

## 2022-03-08 NOTE — PROGRESS NOTES
[address] [facility] [address]  Dept: [address] [facility] [place] Urology Office Note - Established    Patient:  Claudette Rao  YOB: 1950  Date: 3/8/2022    The patient is a 80 y.o. male who presents todayfor evaluation of the following problems:   Chief Complaint   Patient presents with    Follow-up     2 week f/u    Results     CT and Bone scan       HPI  Here to review CT and bone scan. Was found to have sergio 7 & 8 on TRUS bx. PSA at time of diagnosis was 5.28.   CT and bone scan negative for mets. He did have hematuria following biopsy, improving. No dysuria, flank pain, or fevers. He is not interested in surgery at this time. Summary of old records: N/A    Additional History: N/A    Procedures Today: N/A    Urinalysis today:  No results found for this visit on 03/08/22. Last several PSA's:  Lab Results   Component Value Date    PSA 5.28 (H) 12/10/2021    PSA 4.90 (H) 06/11/2021     Last total testosterone:  No results found for: TESTOSTERONE    AUA Symptom Score (3/8/2022): Last BUN and creatinine:  Lab Results   Component Value Date    BUN 15 02/11/2019     Lab Results   Component Value Date    CREATININE 1.03 02/28/2022       Additional Lab/Culture results: none    Imaging Reviewed during this Office Visit: CT and bone scan images reviewed. (results were independently reviewed by physician and radiology report verified)    PAST MEDICAL, FAMILY AND SOCIAL HISTORY UPDATE:  Past Medical History:   Diagnosis Date    Alcohol abuse 2/9/2016    BMI 26.0-26.9,adult 8/6/2015    Diverticulosis     Hyperlipidemia LDL goal < 130 8/6/2015    Mixed hyperlipidemia 8/6/2015    replace inactive diagnosis    Overweight (BMI 25.0-29. 9) 8/6/2015    Sinusitis      Past Surgical History:   Procedure Laterality Date    COLONOSCOPY      10 YEARS AGO    HERNIA REPAIR      RIGHT INGUINAL     No family history on file. Outpatient Medications Marked as Taking for the 3/8/22 encounter (Office Visit) with Natalie Bee MD   Medication Sig Dispense Refill    atorvastatin (LIPITOR) 80 MG tablet Take 0.5 tablets by mouth nightly 90 tablet 3    escitalopram (LEXAPRO) 10 MG tablet TAKE 1 TABLET DAILY 90 tablet 3    Omega-3 Fatty Acids (FISH OIL) 1000 MG CAPS Take 4 capsules by mouth daily 120 capsule 11    fluticasone (FLONASE) 50 MCG/ACT nasal spray USE 1 SPRAY NASALLY DAILY 48 g 3    Multiple Vitamins-Minerals (CENTRUM MEN PO) Take by mouth daily      apixaban (ELIQUIS) 5 MG TABS tablet Take by mouth 2 times daily      metoprolol tartrate (LOPRESSOR) 50 MG tablet Take 50 mg by mouth 2 times daily         Sulfa antibiotics  Social History     Tobacco Use   Smoking Status Never Smoker   Smokeless Tobacco Never Used     (Ifpatient a smoker, smoking cessation counseling offered)    Social History     Substance and Sexual Activity   Alcohol Use No    Alcohol/week: 0.0 standard drinks    Comment: Stopped drinking Dec. 10th 2016       REVIEW OF SYSTEMS:  Review of Systems    Physical Exam:      Vitals:    03/08/22 1408   Temp: 95.3 °F (35.2 °C)     Body mass index is 26.65 kg/m². Patient is a 80 y.o. male in no acute distress and alert and oriented to person, place and time. Physical Exam  Constitutional: Patient in no acute distress. Neuro: Alert and oriented to person, place and time. Psych: Mood normal, affect normal  Lungs: Respiratory effort is normal  Cardiovascular: Warm & Pink  Abdomen: Soft, non-tender, non-distended with no CVA,  No flank tenderness,  Or hepatosplenomegaly   Lymphatics: No palpablelymphadenopathy. Bladder non-tender and not distended. Musculoskeletal: Normal gait and station      Assessment and Plan      1. Elevated PSA    2. Prostate cancer [facility])           Plan:          He is here in follow up for prostate cancer.    He has high risk disease. Met eval is negative. Would recommend referral to rad/Onc. No follow-ups on file. Prescriptions Ordered:  No orders of the defined types were placed in this encounter. Orders Placed:  No orders of the defined types were placed in this encounter. Lambert Medrano MD    Agree with the ROS entered by the MA.

## 2022-03-09 ENCOUNTER — TELEPHONE (OUTPATIENT)
Dept: RADIATION ONCOLOGY | Age: 72
End: 2022-03-09

## 2022-03-09 NOTE — TELEPHONE ENCOUNTER
Referral in work Q for radiation consult. I called pt to schedule, no answer, I left voicemail messg for pt to c/b to schedule consult w/Dr. Damien Diggs.

## 2022-03-15 ENCOUNTER — HOSPITAL ENCOUNTER (OUTPATIENT)
Dept: RADIATION ONCOLOGY | Age: 72
Discharge: HOME | End: 2022-03-15
Payer: MEDICARE

## 2022-03-15 VITALS
RESPIRATION RATE: 18 BRPM | TEMPERATURE: 96.1 F | WEIGHT: 208.6 LBS | SYSTOLIC BLOOD PRESSURE: 164 MMHG | BODY MASS INDEX: 27.65 KG/M2 | OXYGEN SATURATION: 98 % | DIASTOLIC BLOOD PRESSURE: 93 MMHG | HEART RATE: 74 BPM | HEIGHT: 73 IN

## 2022-03-15 DIAGNOSIS — C61 MALIGNANT NEOPLASM OF PROSTATE (HCC): Primary | ICD-10-CM

## 2022-03-15 PROCEDURE — 99205 OFFICE O/P NEW HI 60 MIN: CPT | Performed by: RADIOLOGY

## 2022-03-15 PROCEDURE — 99214 OFFICE O/P EST MOD 30 MIN: CPT | Performed by: RADIOLOGY

## 2022-03-15 NOTE — CONSULTS
[address]            Radiation Oncology          [address] [facility]          [address] [facility] [address]        Leslie Cintron: [phone]        F: [phone]       [email or web address]           3/15/2022    Patient: Deniz Banegas   YOB: 1950       Dear Dr Villarreal Men: Thank you for referring Deniz Banegas to me for evaluation. Below are the relevant portions of my assessment and plan of care. If you have questions, please do not hesitate to call me. I look forward to following this patient along with you. Sincerely,    Electronically signed by Loco Ji MD on 3/15/22 at 11:04 AM EDT    CC: Patient Care Team:  Shemar Lancaster MD as PCP - General (Family Medicine)  Shemar Lancaster MD as PCP - [facility] Provider  ------------------------------------------------------------------------------------------------------------------------------------------------------------------------------------------      RADIATION ONCOLOGY NEW PATIENT VISIT:    Date of Service: 3/15/2022    Location:  [address] [place] [address],   [address] [place] [address]., [facility]   [phone]     Patient ID:   Deniz Banegas  : 1950   MRN: [id number]    CHIEF COMPLAINT: \"newly diagnosed prostate cancer \"    DIAGNOSIS: high risk prostate cancer [id number], PSA 5.28, [place] 4+4 (3/12 cores, 1 core 4+4)     HISTORY OF PRESENT ILLNESS:   Deniz Banegas is a 80 y.o. male who presents for consultation of radiotherapy treatment options of the above  Diagnosis. PSA was 4.9 on 21 and peyton to 5.28 on 12/10/21. He saw his urologist who recommended a TRUS guided biopsy of the prostate. Pathology from 2/15/22 revealed 3 out of 12 cores positive for adenocarcinoma the prostate. He had 1 core 3+4, 1 core of 4+4 (50% max  Involvement), and 1 core of 4+3 (60-70% max involvement) all on the right base/mid. Prostate was measured to be 36 g on ultrasound.     CT abdomen pelvis and bone scan done on 2/28/2022 revealed no evidence of disease. He spoke to urology about treatments and he does not want surgery. He was referred to radiation oncology. He is not on any medications for his bladder. He denies any significant dysuria, hematuria, urgency, weak stream, or problems with his bowels. He is never had radiation therapy before. He does not have a cardiac implanted device. He is never had a heart attack before. He does not have significant ED. AUA Score:  8    PRIOR RADIATION HISTORY:  No prior history of radiation therapy    PACEMAKER: None    PAST MEDICAL HISTORY:  Past Medical History:   Diagnosis Date    A-fib ([facility] [address]     Alcohol abuse 2/9/2016    BMI 26.0-26.9,adult 8/6/2015    Diverticulosis     Hyperlipidemia LDL goal < 130 8/6/2015    Hypertension     Mixed hyperlipidemia 8/6/2015    replace inactive diagnosis    Overweight (BMI 25.0-29. 9) 8/6/2015    Sinusitis        PAST SURGICAL HISTORY:  Past Surgical History:   Procedure Laterality Date    COLONOSCOPY      10 YEARS AGO    HERNIA REPAIR      RIGHT INGUINAL       MEDICATIONS:    Current Outpatient Medications:     atorvastatin (LIPITOR) 80 MG tablet, Take 0.5 tablets by mouth nightly, Disp: 90 tablet, Rfl: 3    escitalopram (LEXAPRO) 10 MG tablet, TAKE 1 TABLET DAILY, Disp: 90 tablet, Rfl: 3    Omega-3 Fatty Acids (FISH OIL) 1000 MG CAPS, Take 4 capsules by mouth daily, Disp: 120 capsule, Rfl: 11    fluticasone (FLONASE) 50 MCG/ACT nasal spray, USE 1 SPRAY NASALLY DAILY, Disp: 48 g, Rfl: 3    Multiple Vitamins-Minerals (CENTRUM MEN PO), Take by mouth daily, Disp: , Rfl:     apixaban (ELIQUIS) 5 MG TABS tablet, Take by mouth 2 times daily, Disp: , Rfl:     metoprolol tartrate (LOPRESSOR) 50 MG tablet, Take 50 mg by mouth 2 times daily, Disp: , Rfl:     ALLERGIES:   Allergies   Allergen Reactions    Sulfa Antibiotics        SOCIAL HISTORY:  Social History     Socioeconomic History    Marital status:      Spouse name: None    Number of children: None    Years of education: None    Highest education level: None   Occupational History    None   Tobacco Use    Smoking status: Never Smoker    Smokeless tobacco: Never Used   Substance and Sexual Activity    Alcohol use: Yes     Alcohol/week: 0.0 standard drinks     Comment: per pt he binge drinks on the weekends     Drug use: Not Currently     Types: Marijuana Deboraha Sangagandeep)    Sexual activity: None   Other Topics Concern    None   Social History Narrative    None     Social Determinants of Health     Financial Resource Strain:     Difficulty of Paying Living Expenses: Not on file   Food Insecurity:     Worried About Running Out of Food in the Last Year: Not on file    Kun of Food in the Last Year: Not on file   Transportation Needs:     Lack of Transportation (Medical): Not on file    Lack of Transportation (Non-Medical): Not on file   Physical Activity:     Days of Exercise per Week: Not on file    Minutes of Exercise per Session: Not on file   Stress:     Feeling of Stress : Not on file   Social Connections:     Frequency of Communication with Friends and Family: Not on file    Frequency of Social Gatherings with Friends and Family: Not on file    Attends [employer] Services: Not on file    Active Member of [address] [place] [address] Positive Networks or Organizations: Not on file    Attends Club or Organization Meetings: Not on file    Marital Status: Not on file   Intimate Partner Violence:     Fear of Current or Ex-Partner: Not on file    Emotionally Abused: Not on file    Physically Abused: Not on file    Sexually Abused: Not on file   Housing Stability:     Unable to Pay for Housing in the Last Year: Not on file    Number of [place] in the Last Year: Not on file    Unstable Housing in the Last Year: Not on file       FAMILY HISTORY:  History reviewed. No pertinent family history.     REVIEW OF SYSTEMS:    GENERAL/CONSTITUTIONAL: The patient denies fever, fatigue, weakness, weight gain or weight loss. HEENT: The patient denies pain, redness, loss of vision, double or blurred vision. The patient denies ringing in the ears, loss of hearing, hoarseness, trouble swallowing, or painful swallowing. CARDIOVASCULAR: The patient denies chest pain, irregular heartbeats, sudden changes in heartbeat or palpitation. RESPIRATORY: The patient denies shortness of breath, cough, hemoptysis. GASTROINTESTINAL: The patient denies nausea, vomiting, diarrhea, constipation, blood in the stools. GENITOURINARY: The patient denies difficult urination, pain or burning with urination, blood in the urine. MUSCULOSKELETAL: The patient denies arm, buttock, thigh or calf cramps. No joint or muscle pain. SKIN: The patient denies easy bruising, skin redness, skin rash, hives. NEUROLOGIC: The patient denies headache, dizziness, fainting, muscle spasm, loss of consciousness. ENDOCRINE: The patient denies intolerance to hot or cold temperature, flushing. HEMATOLOGIC/LYMPHATIC: The patient denies anemia, bleeding tendency or clotting tendency. ALLERGIC/IMMUNOLOGIC: The patient denies rhinitis, asthma, skin sensitivity. PYSCHOLOGIC: The patient denies any depression, anxiety, or confusion. A full 14 point review of systems was performed and assessed and found to be negative except as noted above. PHYSICAL EXAMINATION:    CHAPERONE: Not Required    ECO Asymptomatic    VITAL SIGNS: BP (!) 164/93   Pulse 74   Temp 96.1 °F (35.6 °C) (Temporal)   Resp 18   Ht 6' 1\" (1.854 m)   Wt 208 lb 9.6 oz (94.6 kg)   SpO2 98%   BMI 27.52 kg/m²   GENERAL:  General appearance is that of a well-nourished, well-developed in no apparent distress. HEENT: Normocephalic, atraumatic, EOMI, moist mucosa, no erythema. Indirect exam .  NECK:  No adenopathy or a palpable thyroid mass, trachea is midline. LYMPHATICS: No cervical, supraclavicular, or axillary adenopathy.   HEART: Normal rate and regular rhythm, normal heart sounds. LUNGS:  Pulmonary effort normal. Normal breath sounds. ABDOMEN:  Soft, nontender, non distended, and no hepatosplenomegaly. EXTREMITIES:  No edema. No calf tenderness. MSK:  No spinal tenderness. Normal ROM. NEUROLOGICAL: Alert and oriented. Strength and sensation intact bilaterally. No focal deficits. PSYCH: Mood normal, behavior normal.  SKIN: No erythema, no desquamation. RECTAL: Deferred      LABS:  WBC   Date Value Ref Range Status   02/10/2016 7.3 3.5 - 11.0 k/uL Final     Segs Absolute   Date Value Ref Range Status   02/10/2016 3.90 1.3 - 9.1 k/uL Final     Hemoglobin   Date Value Ref Range Status   02/10/2016 16.8 13.5 - 17.5 g/dL Final     Platelets   Date Value Ref Range Status   02/10/2016 209 150 - 450 k/uL Final     No results found for: , CEA  No results found for:   PSA   Date Value Ref Range Status   12/10/2021 5.28 (H) <4.1 ug/L Final     Comment:     The Roche \"ECLIA\" assay is used. Results obtained with different assay methods cannot be   used interchangeably. 06/11/2021 4.90 (H) <4.1 ug/L Final     Comment:     The Roche \"ECLIA\" assay is used. Results obtained with different assay methods cannot be   used interchangeably. IMAGING:   CT abdomne/pelvis and bone scan 2/28/22 reviewed and noted above. PATHOLOGY:  TRUS guided biopsy report 2/15/22 reviewed and noted above. ASSESSMENT AND PLAN:   David Barry is a 80 y.o. male  With very low volume high risk prostate cancer [place] 4+4, PSA 5.28, [id number] who does not want surgery and presents for consultation radiotherapy treatment options. Technically he does have high risk disease because there was 1 core of [place] 4+4. However I told him it is a very low volume amount of high risk cancer and he has no other high risk features that we know of right now.   Generally for high risk cancer I recommend radiation therapy with 2 to 3 years of androgen deprivation therapy. The radiation would be 44 treatments as well. However, having 1 out of 12 cores with high risk cancer in a PSA under 10 indicates to me that he could be more of a unfavorable intermediate risk treatment candidate. The reason is important because I told him he could do may be 6 to 12 months of ADT instead of 2 to 3 years and the radiation treatments would only be 28 fractions instead of 44. I told him I do think he does need treatment as he does have a core of 4+4 disease, the question is how aggressive we are with the volume of his disease coupled with his age and lack of significant bladder or bowel problems and other comorbidities. At length I discussed indications, risks, benefits of radiation and short-term versus long-term androgen deprivation therapy. After all questions were answered he expressed understanding of his potential diagnosis, prognosis, my recommendations. I told him I had like to get an MRI of his prostate before making my final recommendations. If the MRI shows extracapsular extension, SV involvement, or suspicious lymph nodes, and I be more inclined to treat him as a high risk patient. However, if there is high risk features are not present, then I would feel better about treating him more like an intermediate risk patient. He is agreeable to the plan. I put an order in for MRI prostate to be done next available. I will see him back after the scan is done and to go over it and make my final recommendations. He was agreeable to this. Patient was in agreement with my recommendations. All questions were answered to their satisfaction. Patient was advised to contact us anytime should they have any questions or concerns. I spent greater than 65 minutes counseling and evaluating the different treatment options available to the patient and formulating a plan of action today.       Judie Wong MD MD  Electronically signed by Judie Wong MD on 3/15/22 at 11:04 AM EDT      Drugs Prescribed:  New Prescriptions    No medications on file       Orders Placed:     Orders Placed This Encounter   Procedures    MRI PROSTATE W WO CONTRAST         CC:  Patient Care Team:  Senia Ledesma MD as PCP - General (Family Medicine)  Senia Ledesma MD as PCP - [facility]

## 2022-03-15 NOTE — PROGRESS NOTES
Referring Physician: Dr. Gareth Tineo:    03/15/22 1056   BP: (!) 164/93   Pulse: 74   Resp: 18   Temp: 96.1 °F (35.6 °C)   SpO2: 98%    :  Patient Currently in Pain: No             Wt Readings from Last 1 Encounters:   03/15/22 208 lb 9.6 oz (94.6 kg)        Body mass index is 27.52 kg/m². Height: 6' 1\" (185.4 cm)         Immunizations:    Influenza status:    [x]   Current   []   Patient declined    Pneumococcal status:  [x]   Current  []   Patient declined  Covid status:   [x]  Dose #1:                     [x]  Dose #2:     [x]  Booster:               []  Patient declined    Smoking Status:    [] Smoker - PPD:   [] Nonsmoker - Quit Date:               [x] Never a smoker      No chief complaint on file. Cancer Staging  No matching staging information was found for the patient. Prior Radiation Therapy? No   If yes, site treated:   Facility:                             Date:    Concurrent Chemo/radiation? No   If yes, start date:    Prior Chemotherapy? No   If yes    Facility:                             Date:    Prior Hormonal Therapy or Contraceptive Medications? No   If yes,  Medication:                                Duration:    Head and Neck Cancer? No   If yes, please remind physician to place swallow study order and speech therapy order. Pacemaker/Defibulator/ICD:  No    Do you have any musculoskeletal diseases, Lupus or arthritic conditions? No   If yes, are you currently taking Methotrexate?   []  Yes  []  No           BREAST/GYN  Patient only:     LMP:    Age at first Menses:    Para:    :    Cup size:        PROSTATE patient only :    Oral hormone replacement therapy []  Yes  []  No            Current Outpatient Medications   Medication Sig Dispense Refill    atorvastatin (LIPITOR) 80 MG tablet Take 0.5 tablets by mouth nightly 90 tablet 3    escitalopram (LEXAPRO) 10 MG tablet TAKE 1 TABLET DAILY 90 tablet 3    Omega-3 Fatty Acids (FISH OIL) 1000 MG CAPS Take 4 capsules by mouth daily 120 capsule 11    fluticasone (FLONASE) 50 MCG/ACT nasal spray USE 1 SPRAY NASALLY DAILY 48 g 3    Multiple Vitamins-Minerals (CENTRUM MEN PO) Take by mouth daily      apixaban (ELIQUIS) 5 MG TABS tablet Take by mouth 2 times daily      metoprolol tartrate (LOPRESSOR) 50 MG tablet Take 50 mg by mouth 2 times daily       No current facility-administered medications for this encounter. Past Medical History:   Diagnosis Date    Alcohol abuse 2/9/2016    BMI 26.0-26.9,adult 8/6/2015    Diverticulosis     Hyperlipidemia LDL goal < 130 8/6/2015    Mixed hyperlipidemia 8/6/2015    replace inactive diagnosis    Overweight (BMI 25.0-29. 9) 8/6/2015    Sinusitis        Past Surgical History:   Procedure Laterality Date    COLONOSCOPY      10 YEARS AGO    HERNIA REPAIR      RIGHT INGUINAL       No family history on file. Social History     Socioeconomic History    Marital status:      Spouse name: Not on file    Number of children: Not on file    Years of education: Not on file    Highest education level: Not on file   Occupational History    Not on file   Tobacco Use    Smoking status: Never Smoker    Smokeless tobacco: Never Used   Substance and Sexual Activity    Alcohol use: No     Alcohol/week: 0.0 standard drinks     Comment: Stopped drinking Dec. 10th 2016    Drug use: Not on file    Sexual activity: Not on file   Other Topics Concern    Not on file   Social History Narrative    Not on file     Social Determinants of Health     Financial Resource Strain:     Difficulty of Paying Living Expenses: Not on file   Food Insecurity:     Worried About [address] in the Last Year: Not on file    Kun of Food in the Last Year: Not on file   Transportation Needs:     Lack of Transportation (Medical): Not on file    Lack of Transportation (Non-Medical):  Not on file   Physical Activity:     Days of Exercise per Week: Not on file    Minutes of Exercise per Session: Not on file Stress:     Feeling of Stress : Not on file   Social Connections:     Frequency of Communication with Friends and Family: Not on file    Frequency of Social Gatherings with Friends and Family: Not on file    Attends Sabianist Services: Not on file    Active Member of Clubs or Organizations: Not on file    Attends Club or Organization Meetings: Not on file    Marital Status: Not on file   Intimate Partner Violence:     Fear of Current or Ex-Partner: Not on file    Emotionally Abused: Not on file    Physically Abused: Not on file    Sexually Abused: Not on file   Housing Stability:     Unable to Pay for Housing in the Last Year: Not on file    Number of [place] in the Last Year: Not on file    Unstable Housing in the Last Year: Not on file             FALLS RISK SCREEN  Instructions:  Assess the patient and enter the appropriate indicators that are present for fall risk identification. Total the numbers entered and assign a fall risk score from Table 2.  Reassess patient at a minimum every 12 weeks or with status change. Assessment   Date  3/15/2022     1. Mental Ability: confusion/cognitively impaired 0     2. Elimination Issues: incontinence, frequency 0       3. Ambulatory: use of assistive devices (walker, cane, off-loading devices),        attached to equipment (IV pole, oxygen) 0     4. Sensory Limitations: dizziness, vertigo, impaired vision 0     5. Age less than 65        0     6. Age 72 or greater 1     7. Medication: diuretics, strong analgesics, hypnotics, sedatives,        antihypertensive agents 3   8. Falls:  recent history of falls within the last 3 months (not to include slipping or        tripping) 0   TOTAL 4    If score of 4 or greater was education given? Yes       TABLE 2   Risk Score Risk Level Plan of Care   0-3 Little or  No Risk 1. Provide assistance as indicated for ambulation activities  2. Reorient confused/cognitively impaired patient  3.   Call-light/bell within patient's reach  4. Chair/bed in low position, stretcher/bed with siderails up except when performing patient care activities  5. Educate patient/family/caregiver on falls prevention  6.  Reassess in 12 weeks or with any noted change in patient condition which places them at a risk for a fall   4-6 Moderate Risk 1. Provide assistance as indicated for ambulation activities  2. Reorient confused/cognitively impaired patient  3. Call-light/bell within patient's reach  4. Chair/bed in low position, stretcher/bed with siderails up except when performing patient care activities  5. Educate patient/family/caregiver on falls prevention     7 or   Higher High Risk 1. Place patient in easily observable treatment room  2. Patient attended at all times by family member or staff  3. Provide assistance as indicated for ambulation activities  4. Reorient confused/cognitively impaired patient  5. Call-light/bell within patient's reach  6. Chair/bed in low position, stretcher/bed with siderails up except when performing patient care activities  7. Educate patient/family/caregiver on falls prevention             Assessment/Plan: Patient was seen today for consultation. Here to discuss RT treatment for prostate cancer. Dr. Miranda Douglas updated and examined pt. Per MD pt will have an MRI and will follow up after for review and decide on treatment plan. Alfonso Young assisted with scheduling scan and follow up.         Omid Allen RN 3/15/2022 11:01 AM

## 2022-03-22 ENCOUNTER — HOSPITAL ENCOUNTER (OUTPATIENT)
Dept: MRI IMAGING | Age: 72
Discharge: HOME | End: 2022-03-24
Payer: MEDICARE

## 2022-03-22 DIAGNOSIS — C61 MALIGNANT NEOPLASM OF PROSTATE (HCC): ICD-10-CM

## 2022-03-22 PROCEDURE — 72197 MRI PELVIS W/O & W/DYE: CPT

## 2022-03-22 PROCEDURE — 6360000004 HC RX CONTRAST MEDICATION: Performed by: RADIOLOGY

## 2022-03-22 PROCEDURE — 82565 ASSAY OF CREATININE: CPT

## 2022-03-22 PROCEDURE — 2580000003 HC RX 258: Performed by: RADIOLOGY

## 2022-03-22 PROCEDURE — A9579 GAD-BASE MR CONTRAST NOS,1ML: HCPCS | Performed by: RADIOLOGY

## 2022-03-22 RX ORDER — 0.9 % SODIUM CHLORIDE 0.9 %
40 INTRAVENOUS SOLUTION INTRAVENOUS ONCE
Status: COMPLETED | OUTPATIENT
Start: 2022-03-22 | End: 2022-03-22

## 2022-03-22 RX ORDER — SODIUM CHLORIDE 0.9 % (FLUSH) 0.9 %
10 SYRINGE (ML) INJECTION PRN
Status: DISCONTINUED | OUTPATIENT
Start: 2022-03-22 | End: 2022-03-25 | Stop reason: HOSPADM

## 2022-03-22 RX ADMIN — SODIUM CHLORIDE 40 ML: 9 INJECTION, SOLUTION INTRAVENOUS at 21:30

## 2022-03-22 RX ADMIN — SODIUM CHLORIDE, PRESERVATIVE FREE 10 ML: 5 INJECTION INTRAVENOUS at 21:30

## 2022-03-22 RX ADMIN — GADOTERIDOL 18 ML: 279.3 INJECTION, SOLUTION INTRAVENOUS at 21:30

## 2022-03-23 ENCOUNTER — HOSPITAL ENCOUNTER (OUTPATIENT)
Dept: RADIATION ONCOLOGY | Age: 72
Discharge: HOME | End: 2022-03-23
Payer: MEDICARE

## 2022-03-23 VITALS
HEART RATE: 81 BPM | SYSTOLIC BLOOD PRESSURE: 147 MMHG | RESPIRATION RATE: 16 BRPM | DIASTOLIC BLOOD PRESSURE: 87 MMHG | BODY MASS INDEX: 27.26 KG/M2 | WEIGHT: 206.6 LBS | TEMPERATURE: 96.9 F | OXYGEN SATURATION: 100 %

## 2022-03-23 PROCEDURE — 99212 OFFICE O/P EST SF 10 MIN: CPT | Performed by: RADIOLOGY

## 2022-03-23 PROCEDURE — 99213 OFFICE O/P EST LOW 20 MIN: CPT | Performed by: RADIOLOGY

## 2022-03-23 NOTE — PROGRESS NOTES
[facility]            Radiation Oncology          [address] [facility]          [place], [address]        Deb [place]ard: [phone]        F: [phone]       [email or web address]         Date of Service: 3/23/2022     Location:  [facility],   [address] [facility] omicPattie Perdomo   [phone]        [address] [place] [address] FOLLOW UP NOTE    Patient ID:   Dora Glasgow  : 1950   MRN: [id number]    DIAGNOSIS: low volume HR prostate cancer GS 4+4 (1/3 cores), PSA 5.2, [id number]      INTERVAL HISTORY:   Dora Glasgow is a 80 y.o.. male presents for short follow up of the above diagnosis. MRI prostate done 3/22/22 revealed no definite evidence of extraprostatic extension and no suspicious lymph nodes with disease involvement. He's back today to discuss treatment options. He has no issues with bladder or bowels. MEDICATIONS:    Current Outpatient Medications:     atorvastatin (LIPITOR) 80 MG tablet, Take 0.5 tablets by mouth nightly, Disp: 90 tablet, Rfl: 3    escitalopram (LEXAPRO) 10 MG tablet, TAKE 1 TABLET DAILY, Disp: 90 tablet, Rfl: 3    Omega-3 Fatty Acids (FISH OIL) 1000 MG CAPS, Take 4 capsules by mouth daily, Disp: 120 capsule, Rfl: 11    fluticasone (FLONASE) 50 MCG/ACT nasal spray, USE 1 SPRAY NASALLY DAILY, Disp: 48 g, Rfl: 3    Multiple Vitamins-Minerals (CENTRUM MEN PO), Take by mouth daily, Disp: , Rfl:     apixaban (ELIQUIS) 5 MG TABS tablet, Take by mouth 2 times daily, Disp: , Rfl:     metoprolol tartrate (LOPRESSOR) 50 MG tablet, Take 50 mg by mouth 2 times daily, Disp: , Rfl:   No current facility-administered medications for this encounter.     Facility-Administered Medications Ordered in Other Encounters:     sodium chloride flush 0.9 % injection 10 mL, 10 mL, IntraVENous, PRN, Nena Deluna MD, 10 mL at 22    ALLERGIES:  Allergies   Allergen Reactions    Sulfa Antibiotics          REVIEW OF SYSTEMS: A full 14 point review of systems was performed and assessed and found to be negative except as noted above. PHYSICAL EXAMINATION:    CHAPERONE: Not Required    ECO Asymptomatic    VITAL SIGNS: BP (!) 147/87   Pulse 81   Temp 96.9 °F (36.1 °C) (Temporal)   Resp 16   Wt 206 lb 9.6 oz (93.7 kg)   SpO2 100%   BMI 27.26 kg/m²   GENERAL:  General appearance is that of a well-nourished, well-developed in no apparent distress. HEENT: Normocephalic, atraumatic, EOMI, moist mucosa, no erythema. Indirect exam .  NECK:  No adenopathy or a palpable thyroid mass, trachea is midline. LYMPHATICS: No cervical, supraclavicular, or axillary adenopathy. HEART:  Normal rate and regular rhythm, normal heart sounds. LUNGS:  Pulmonary effort normal. Normal breath sounds. ABDOMEN:  Soft, nontender, non distended, and no hepatosplenomegal.  EXTREMITIES:  No edema. No calf tenderness. MSK:  No spinal tenderness. Normal ROM. NEUROLOGICAL: Alert and oriented. Strength and sensation intact bilaterally. No focal deficits. PSYCH: Mood normal, behavior normal.  SKIN: No erythema, no desquamation. LABS:  WBC   Date Value Ref Range Status   02/10/2016 7.3 3.5 - 11.0 k/uL Final     Segs Absolute   Date Value Ref Range Status   02/10/2016 3.90 1.3 - 9.1 k/uL Final     Hemoglobin   Date Value Ref Range Status   02/10/2016 16.8 13.5 - 17.5 g/dL Final     Platelets   Date Value Ref Range Status   02/10/2016 209 150 - 450 k/uL Final     No results found for: , CEA  PSA   Date Value Ref Range Status   12/10/2021 5.28 (H) <4.1 ug/L Final     Comment:     The Roche \"ECLIA\" assay is used. Results obtained with different assay methods cannot be   used interchangeably. 2021 4.90 (H) <4.1 ug/L Final     Comment:     The Roche \"ECLIA\" assay is used. Results obtained with different assay methods cannot be   used interchangeably.          IMAGING:   MRI prostate 3/22/22 reviewed and noted above       ASSESSMENT AND PLAN:  Raeford Apley is a 80 y.o. male with low volume HR prostate cancer with an MRI showing no evidence of extraprostatic extension/lymph nodes presents for follow up to discuss treatment options. He doesn't have any high risk features on MRI and so per our conversation a few weeks ago, I think it wouldn't be unreasonable to be treated more like unfavorable IR disease as compared to typical HR treatment. I recommend a hypofractionated course 70Gy/28fx with consideration of ADT (6-12 months v 2-3 years). The indications, risks, and benefits were discussed at length, and at the end of our conversation he expressed understanding and agreement of the plan. He would like to proceed with radiation alone in this situation and I told him that's okay as long as he understands the pros/cons of it. He assured me he did. He signed consent and will undergo CT simulation next available and we will aim to start his treatments in a timely manner. Patient was in agreement with my recommendations. All questions were answered to their satisfaction. Patient was advised to contact us anytime should they have any questions or concerns. Electronically signed by Cameron Glover MD on 3/23/2022 at 2:20 PM        Medications Prescribed:   New Prescriptions    No medications on file       Orders: No orders of the defined types were placed in this encounter.       CC:  Patient Care Team:  Hilario Don MD as PCP - General (Family Medicine)  Hilario Don [facility] as [facility]

## 2022-03-23 NOTE — PROGRESS NOTES
John Lobe  3/23/2022  1:58 PM      There were no vitals filed for this visit. :                Wt Readings from Last 1 Encounters:   03/15/22 208 lb 9.6 oz (94.6 kg)                Current Outpatient Medications:     atorvastatin (LIPITOR) 80 MG tablet, Take 0.5 tablets by mouth nightly, Disp: 90 tablet, Rfl: 3    escitalopram (LEXAPRO) 10 MG tablet, TAKE 1 TABLET DAILY, Disp: 90 tablet, Rfl: 3    Omega-3 Fatty Acids (FISH OIL) 1000 MG CAPS, Take 4 capsules by mouth daily, Disp: 120 capsule, Rfl: 11    fluticasone (FLONASE) 50 MCG/ACT nasal spray, USE 1 SPRAY NASALLY DAILY, Disp: 48 g, Rfl: 3    Multiple Vitamins-Minerals (CENTRUM MEN PO), Take by mouth daily, Disp: , Rfl:     apixaban (ELIQUIS) 5 MG TABS tablet, Take by mouth 2 times daily, Disp: , Rfl:     metoprolol tartrate (LOPRESSOR) 50 MG tablet, Take 50 mg by mouth 2 times daily, Disp: , Rfl:   No current facility-administered medications for this encounter. Facility-Administered Medications Ordered in Other Encounters:     sodium chloride flush 0.9 % injection 10 mL, 10 mL, IntraVENous, PRN, Liang Hoff MD, 10 mL at 03/22/22 2130               FALLS RISK SCREEN  Instructions:  Assess the patient and enter the appropriate indicators that are present for fall risk identification. Total the numbers entered and assign a fall risk score from Table 2.  Reassess patient at a minimum every 12 weeks or with status change. Assessment   Date  3/23/2022     1. Mental Ability: confusion/cognitively impaired 0     2. Elimination Issues: incontinence, frequency 0       3. Ambulatory: use of assistive devices (walker, cane, off-loading devices),        attached to equipment (IV pole, oxygen) 0     4. Sensory Limitations: dizziness, vertigo, impaired vision 0     5. Age less than 65        0     6. Age 72 or greater 1     7. Medication: diuretics, strong analgesics, hypnotics, sedatives,        antihypertensive agents 0   8.   Falls:  recent history of falls within the last 3 months (not to include slipping or        tripping) 0   TOTAL 1    If score of 4 or greater was education given? No           TABLE 2   Risk Score Risk Level Plan of Care   0-3 Little or  No Risk 1. Provide assistance as indicated for ambulation activities  2. Reorient confused/cognitively impaired patient  3. Chair/bed in low position, stretcher/bed with siderails up except when performing patient care activities  5. Educate patient/family/caregiver on falls prevention  6.  Reassess in 12 weeks or with any noted change in patient condition which places them at a risk for a fall   4-6 Moderate Risk 1. Provide assistance as indicated for ambulation activities  2. Reorient confused/cognitively impaired patient  3. Chair/bed in low position, stretcher/bed with siderails up except when performing patient care activities  4. Educate patient/family/caregiver on falls prevention     7 or   Higher High Risk 1. Place patient in easily observable treatment room  2. Patient attended at all times by family member or staff  3. Provide assistance as indicated for ambulation activities  4. Reorient confused/cognitively impaired patient  5. Chair/bed in low position, stretcher/bed with siderails up except when performing patient care activities  6. Educate patient/family/caregiver on falls prevention         PLAN: Patient is seen today in follow up. MRI prostate from yesterday to be reviewed by Dr. Miranda Douglas.         Isis Dorantes RN

## 2022-04-08 ENCOUNTER — HOSPITAL ENCOUNTER (OUTPATIENT)
Dept: RADIATION ONCOLOGY | Age: 72
Discharge: HOME | End: 2022-04-08
Attending: RADIOLOGY
Payer: MEDICARE

## 2022-04-08 PROCEDURE — 77334 RADIATION TREATMENT AID(S): CPT | Performed by: RADIOLOGY

## 2022-04-08 PROCEDURE — 77263 THER RADIOLOGY TX PLNG CPLX: CPT | Performed by: RADIOLOGY

## 2022-04-08 NOTE — PROGRESS NOTES
Pt here today for teach and simulation scan. Radiation and You information provided along with additional education regarding radiation therapy and what to expect. Pt verbalizes understanding and all questions answered to the best of my knowledge. Samples of aquaphor and community resource information provided. Pt escorted to CT room without any difficulty. Patient verbalized understanding of site specific side effects of radiation to his prostate (28 fractions). Patient aware to arrive with a comfortable full bladder for daily treatment.

## 2022-04-12 ENCOUNTER — TELEPHONE (OUTPATIENT)
Dept: ONCOLOGY | Age: 72
End: 2022-04-12

## 2022-04-12 NOTE — TELEPHONE ENCOUNTER
[address] [facility] [place] [facility] Nutrition Note    PGSGA scoring tool reviewed with score <4. Automatic nutrition assessment consult populates from PGSGA tool for scores > 4. Will continue to follow as requested.     WAQAR Pérez, RD, LD  Registered Dietitian   [facility]  [phone]

## 2022-04-18 ENCOUNTER — HOSPITAL ENCOUNTER (OUTPATIENT)
Dept: RADIATION ONCOLOGY | Age: 72
Discharge: HOME | End: 2022-04-18
Attending: RADIOLOGY
Payer: MEDICARE

## 2022-04-18 PROCEDURE — 77300 RADIATION THERAPY DOSE PLAN: CPT | Performed by: RADIOLOGY

## 2022-04-18 PROCEDURE — 77338 DESIGN MLC DEVICE FOR IMRT: CPT | Performed by: RADIOLOGY

## 2022-04-18 PROCEDURE — 77301 RADIOTHERAPY DOSE PLAN IMRT: CPT | Performed by: RADIOLOGY

## 2022-04-19 PROCEDURE — 77336 RADIATION PHYSICS CONSULT: CPT | Performed by: RADIOLOGY

## 2022-04-20 ENCOUNTER — HOSPITAL ENCOUNTER (OUTPATIENT)
Dept: RADIATION ONCOLOGY | Age: 72
Discharge: HOME | End: 2022-04-20
Attending: RADIOLOGY
Payer: MEDICARE

## 2022-04-20 PROCEDURE — 77385 HC NTSTY MODUL RAD TX DLVR SMPL: CPT | Performed by: RADIOLOGY

## 2022-04-20 PROCEDURE — 77336 RADIATION PHYSICS CONSULT: CPT | Performed by: RADIOLOGY

## 2022-04-20 PROCEDURE — G6002 STEREOSCOPIC X-RAY GUIDANCE: HCPCS | Performed by: RADIOLOGY

## 2022-04-21 ENCOUNTER — HOSPITAL ENCOUNTER (OUTPATIENT)
Dept: RADIATION ONCOLOGY | Age: 72
Discharge: HOME | End: 2022-04-21
Attending: RADIOLOGY
Payer: MEDICARE

## 2022-04-21 PROCEDURE — 77385 HC NTSTY MODUL RAD TX DLVR SMPL: CPT | Performed by: RADIOLOGY

## 2022-04-21 PROCEDURE — G6002 STEREOSCOPIC X-RAY GUIDANCE: HCPCS | Performed by: RADIOLOGY

## 2022-04-22 ENCOUNTER — HOSPITAL ENCOUNTER (OUTPATIENT)
Dept: RADIATION ONCOLOGY | Age: 72
Discharge: HOME | End: 2022-04-22
Attending: RADIOLOGY
Payer: MEDICARE

## 2022-04-22 PROCEDURE — 77385 HC NTSTY MODUL RAD TX DLVR SMPL: CPT | Performed by: RADIOLOGY

## 2022-04-22 PROCEDURE — G6002 STEREOSCOPIC X-RAY GUIDANCE: HCPCS | Performed by: RADIOLOGY

## 2022-04-25 ENCOUNTER — HOSPITAL ENCOUNTER (OUTPATIENT)
Dept: RADIATION ONCOLOGY | Age: 72
Discharge: HOME | End: 2022-04-25
Attending: RADIOLOGY
Payer: MEDICARE

## 2022-04-25 PROCEDURE — 77385 HC NTSTY MODUL RAD TX DLVR SMPL: CPT | Performed by: RADIOLOGY

## 2022-04-25 PROCEDURE — G6002 STEREOSCOPIC X-RAY GUIDANCE: HCPCS | Performed by: RADIOLOGY

## 2022-04-26 ENCOUNTER — HOSPITAL ENCOUNTER (OUTPATIENT)
Dept: RADIATION ONCOLOGY | Age: 72
Discharge: HOME | End: 2022-04-26
Attending: RADIOLOGY
Payer: MEDICARE

## 2022-04-26 PROCEDURE — 77385 HC NTSTY MODUL RAD TX DLVR SMPL: CPT | Performed by: RADIOLOGY

## 2022-04-26 PROCEDURE — G6002 STEREOSCOPIC X-RAY GUIDANCE: HCPCS | Performed by: RADIOLOGY

## 2022-04-27 ENCOUNTER — HOSPITAL ENCOUNTER (OUTPATIENT)
Dept: RADIATION ONCOLOGY | Age: 72
Discharge: HOME | End: 2022-04-27
Attending: RADIOLOGY
Payer: MEDICARE

## 2022-04-27 VITALS
BODY MASS INDEX: 27.05 KG/M2 | TEMPERATURE: 98.5 F | SYSTOLIC BLOOD PRESSURE: 145 MMHG | HEART RATE: 72 BPM | WEIGHT: 205 LBS | OXYGEN SATURATION: 99 % | RESPIRATION RATE: 14 BRPM | DIASTOLIC BLOOD PRESSURE: 96 MMHG

## 2022-04-27 PROCEDURE — 77427 RADIATION TX MANAGEMENT X5: CPT | Performed by: RADIOLOGY

## 2022-04-27 PROCEDURE — 77385 HC NTSTY MODUL RAD TX DLVR SMPL: CPT | Performed by: RADIOLOGY

## 2022-04-27 PROCEDURE — G6002 STEREOSCOPIC X-RAY GUIDANCE: HCPCS | Performed by: RADIOLOGY

## 2022-04-27 NOTE — PROGRESS NOTES
- 1.19 mg/dL Final   02/28/2022 1.03 0.51 - 1.19 mg/dL Final     No results found for: , CEA  PSA   Date Value Ref Range Status   12/10/2021 5.28 (H) <4.1 ug/L Final     Comment:     The Roche \"ECLIA\" assay is used. Results obtained with different assay methods cannot be   used interchangeably. 06/11/2021 4.90 (H) <4.1 ug/L Final     Comment:     The Roche \"ECLIA\" assay is used. Results obtained with different assay methods cannot be   used interchangeably. MEDICATIONS:    Current Outpatient Medications:     atorvastatin (LIPITOR) 80 MG tablet, Take 0.5 tablets by mouth nightly, Disp: 90 tablet, Rfl: 3    escitalopram (LEXAPRO) 10 MG tablet, TAKE 1 TABLET DAILY, Disp: 90 tablet, Rfl: 3    Omega-3 Fatty Acids (FISH OIL) 1000 MG CAPS, Take 4 capsules by mouth daily, Disp: 120 capsule, Rfl: 11    fluticasone (FLONASE) 50 MCG/ACT nasal spray, USE 1 SPRAY NASALLY DAILY, Disp: 48 g, Rfl: 3    Multiple Vitamins-Minerals (CENTRUM MEN PO), Take by mouth daily, Disp: , Rfl:     apixaban (ELIQUIS) 5 MG TABS tablet, Take by mouth 2 times daily, Disp: , Rfl:     metoprolol tartrate (LOPRESSOR) 50 MG tablet, Take 50 mg by mouth 2 times daily, Disp: , Rfl:       ASSESSMENT PLAN:     Continue radiation therapy. We'll see if right groin discomfort resolves. If it doesn't will consider imaging/sending him to PCP to assess. Treatment setup and plan reviewed. Port images/CBCT images reviewed. Appropriate laboratory work was reviewed. Treatment side effects and toxicities reviewed with the patient, and appropriate management was advised. Will continue radiation treatment as planned, and recommend patient contact us if they have any questions or concerns. Electronically signed by Erinn Lakhani MD on 4/27/2022 at 11:59 AM      Drugs Prescribed:  New Prescriptions    No medications on file       Other Orders Placed:  No orders of the defined types were placed in this encounter.

## 2022-04-28 ENCOUNTER — HOSPITAL ENCOUNTER (OUTPATIENT)
Dept: RADIATION ONCOLOGY | Age: 72
Discharge: HOME | End: 2022-04-28
Attending: RADIOLOGY
Payer: MEDICARE

## 2022-04-28 PROCEDURE — 77385 HC NTSTY MODUL RAD TX DLVR SMPL: CPT | Performed by: RADIOLOGY

## 2022-04-28 PROCEDURE — G6002 STEREOSCOPIC X-RAY GUIDANCE: HCPCS | Performed by: RADIOLOGY

## 2022-04-29 ENCOUNTER — HOSPITAL ENCOUNTER (OUTPATIENT)
Dept: RADIATION ONCOLOGY | Age: 72
Discharge: HOME | End: 2022-04-29
Attending: RADIOLOGY
Payer: MEDICARE

## 2022-04-29 PROCEDURE — 77385 HC NTSTY MODUL RAD TX DLVR SMPL: CPT | Performed by: RADIOLOGY

## 2022-04-29 PROCEDURE — G6002 STEREOSCOPIC X-RAY GUIDANCE: HCPCS | Performed by: RADIOLOGY

## 2022-05-02 ENCOUNTER — HOSPITAL ENCOUNTER (OUTPATIENT)
Dept: RADIATION ONCOLOGY | Age: 72
Discharge: HOME | End: 2022-05-02
Attending: RADIOLOGY
Payer: MEDICARE

## 2022-05-02 PROCEDURE — 77385 HC NTSTY MODUL RAD TX DLVR SMPL: CPT | Performed by: RADIOLOGY

## 2022-05-02 PROCEDURE — G6002 STEREOSCOPIC X-RAY GUIDANCE: HCPCS | Performed by: RADIOLOGY

## 2022-05-03 ENCOUNTER — HOSPITAL ENCOUNTER (OUTPATIENT)
Dept: RADIATION ONCOLOGY | Age: 72
Discharge: HOME | End: 2022-05-03
Attending: RADIOLOGY
Payer: MEDICARE

## 2022-05-03 PROCEDURE — 77385 HC NTSTY MODUL RAD TX DLVR SMPL: CPT | Performed by: RADIOLOGY

## 2022-05-03 PROCEDURE — G6002 STEREOSCOPIC X-RAY GUIDANCE: HCPCS | Performed by: RADIOLOGY

## 2022-05-04 ENCOUNTER — HOSPITAL ENCOUNTER (OUTPATIENT)
Dept: RADIATION ONCOLOGY | Age: 72
Discharge: HOME | End: 2022-05-04
Attending: RADIOLOGY
Payer: MEDICARE

## 2022-05-04 VITALS
RESPIRATION RATE: 16 BRPM | DIASTOLIC BLOOD PRESSURE: 85 MMHG | BODY MASS INDEX: 27.02 KG/M2 | HEART RATE: 76 BPM | SYSTOLIC BLOOD PRESSURE: 149 MMHG | WEIGHT: 204.8 LBS | OXYGEN SATURATION: 98 % | TEMPERATURE: 96.8 F

## 2022-05-04 PROCEDURE — G6002 STEREOSCOPIC X-RAY GUIDANCE: HCPCS | Performed by: RADIOLOGY

## 2022-05-04 PROCEDURE — 77336 RADIATION PHYSICS CONSULT: CPT | Performed by: RADIOLOGY

## 2022-05-04 PROCEDURE — 77427 RADIATION TX MANAGEMENT X5: CPT | Performed by: RADIOLOGY

## 2022-05-04 PROCEDURE — 77385 HC NTSTY MODUL RAD TX DLVR SMPL: CPT | Performed by: RADIOLOGY

## 2022-05-04 NOTE — PROGRESS NOTES
Олег Reis  5/4/2022  Wt Readings from Last 3 Encounters:   05/04/22 204 lb 12.8 oz (92.9 kg)   04/27/22 205 lb (93 kg)   03/23/22 206 lb 9.6 oz (93.7 kg)     Body mass index is 27.02 kg/m². Treatment Area: Prostate    Patient was seen today for weekly visit. Comfort Alteration    Fatigue: Mild    Nutritional Alteration  Anorexia: No  Nausea: No  Vomiting: No     Elimination Alterations  Constipation: no  Diarrhea:  yes  Urinary Frequency/Urgency: Yes  Urinary Retention: No  Dysuria: No  Urinary Incontinence: No  Proctitis: No  Nocturia: Yes #/night: 2     Emotional  Coping: effective      Skin Alteration   Sensation  WDL    Radiation Dermatitis:  Intact []     Erythema  []     Discoloration  []     Rash []     Dry desquamation  []     Moist desquamation []           Injury, potential bleeding or infection:     Lab Results   Component Value Date    WBC 7.3 02/10/2016     02/10/2016         BP (!) 149/85   Pulse 76   Temp 96.8 °F (36 °C) (Temporal)   Resp 16   Wt 204 lb 12.8 oz (92.9 kg)   SpO2 98%   BMI 27.02 kg/m²                   Assessment/Plan: Patient was seen today for weekly visit. Reports urinary frequency and urgency. Getting up twice per night and denies burning with urination. Has history of diverticulitis and states he has had more blood in his stool and is having diarrhea. He does have a history of hemorrhoid as well. Reports noticeable increase in fatigue as well.       Vane Wilson RN

## 2022-05-04 NOTE — PROGRESS NOTES
[address] [facility]ly [facility], [address] [place] [address]  Fax [phone]  Office [phone] [facility] WEEKLY PROGRESS NOTE  Patient ID:   Maicol Posey  : 1950   MRN: [id number]    DIAGNOSIS:  very low volume high risk prostate cancer [place] 4+4, PSA 5.28, [id number]    TREATMENT DETAILS:  Treatment Site: prostate  Actual Dose: 2750 cGy  Total Planned Dose: 7000 cGy  Treatment Technique: IMRT  Fraction Technique: Daily  Therapy imaging monitoring: CBCT daily  Concurrent Chemotherapy: none    SUBJECTIVE:   Patient seen for their weekly on treatment evaluation today. Doing well. Some looser bowel movements but otherwise okay. No significant bladder problems at this time. OBJECTIVE:   CHAPERONE: Not Required    ECO Asymptomatic    VITAL SIGNS: BP (!) 149/85   Pulse 76   Temp 96.8 °F (36 °C) (Temporal)   Resp 16   Wt 204 lb 12.8 oz (92.9 kg)   SpO2 98%   BMI 27.02 kg/m²   Wt Readings from Last 5 Encounters:   22 204 lb 12.8 oz (92.9 kg)   22 205 lb (93 kg)   22 206 lb 9.6 oz (93.7 kg)   03/15/22 208 lb 9.6 oz (94.6 kg)   22 202 lb (91.6 kg)     GENERAL:  General appearance is that of a well-nourished, well-developed in no apparent distress. HEENT: Normocephalic, atraumatic, EOMI, moist mucosa, no erythema. Indirect exam .  NECK:  No adenopathy or a palpable thyroid mass, trachea is midline.     LABS:  WBC   Date Value Ref Range Status   02/10/2016 7.3 3.5 - 11.0 k/uL Final     Segs Absolute   Date Value Ref Range Status   02/10/2016 3.90 1.3 - 9.1 k/uL Final     Hemoglobin   Date Value Ref Range Status   02/10/2016 16.8 13.5 - 17.5 g/dL Final     Platelets   Date Value Ref Range Status   02/10/2016 209 150 - 450 k/uL Final     CREATININE   Date Value Ref Range Status   2019 0.98 0.70 - 1.20 mg/dL Final   2018 0.84 0.70 - 1.20 mg/dL Final   02/10/2017 0.98 0.70 - 1.20 mg/dL Final     POC Creatinine   Date Value Ref Range Status 03/22/2022 1.03 0.51 - 1.19 mg/dL Final   02/28/2022 1.03 0.51 - 1.19 mg/dL Final     No results found for: , CEA  PSA   Date Value Ref Range Status   12/10/2021 5.28 (H) <4.1 ug/L Final     Comment:     The Roche \"ECLIA\" assay is used. Results obtained with different assay methods cannot be   used interchangeably. 06/11/2021 4.90 (H) <4.1 ug/L Final     Comment:     The Roche \"ECLIA\" assay is used. Results obtained with different assay methods cannot be   used interchangeably. MEDICATIONS:    Current Outpatient Medications:     atorvastatin (LIPITOR) 80 MG tablet, Take 0.5 tablets by mouth nightly, Disp: 90 tablet, Rfl: 3    escitalopram (LEXAPRO) 10 MG tablet, TAKE 1 TABLET DAILY, Disp: 90 tablet, Rfl: 3    Omega-3 Fatty Acids (FISH OIL) 1000 MG CAPS, Take 4 capsules by mouth daily, Disp: 120 capsule, Rfl: 11    fluticasone (FLONASE) 50 MCG/ACT nasal spray, USE 1 SPRAY NASALLY DAILY, Disp: 48 g, Rfl: 3    Multiple Vitamins-Minerals (CENTRUM MEN PO), Take by mouth daily, Disp: , Rfl:     apixaban (ELIQUIS) 5 MG TABS tablet, Take by mouth 2 times daily, Disp: , Rfl:     metoprolol tartrate (LOPRESSOR) 50 MG tablet, Take 50 mg by mouth 2 times daily, Disp: , Rfl:       ASSESSMENT PLAN:     Doing fine. Continue radiation therapy. Treatment setup and plan reviewed. Port images/CBCT images reviewed. Appropriate laboratory work was reviewed. Treatment side effects and toxicities reviewed with the patient, and appropriate management was advised. Will continue radiation treatment as planned, and recommend patient contact us if they have any questions or concerns. Electronically signed by Cherie Mcnair MD on 5/4/2022 at 12:38 PM      Drugs Prescribed:  New Prescriptions    No medications on file       Other Orders Placed:  No orders of the defined types were placed in this encounter.

## 2022-05-05 ENCOUNTER — HOSPITAL ENCOUNTER (OUTPATIENT)
Dept: RADIATION ONCOLOGY | Age: 72
Discharge: HOME | End: 2022-05-05
Attending: RADIOLOGY
Payer: MEDICARE

## 2022-05-05 PROCEDURE — 77385 HC NTSTY MODUL RAD TX DLVR SMPL: CPT | Performed by: RADIOLOGY

## 2022-05-05 PROCEDURE — G6002 STEREOSCOPIC X-RAY GUIDANCE: HCPCS | Performed by: RADIOLOGY

## 2022-05-06 ENCOUNTER — HOSPITAL ENCOUNTER (OUTPATIENT)
Dept: RADIATION ONCOLOGY | Age: 72
Discharge: HOME | End: 2022-05-06
Attending: RADIOLOGY
Payer: MEDICARE

## 2022-05-06 PROCEDURE — G6002 STEREOSCOPIC X-RAY GUIDANCE: HCPCS | Performed by: RADIOLOGY

## 2022-05-06 PROCEDURE — 77385 HC NTSTY MODUL RAD TX DLVR SMPL: CPT | Performed by: RADIOLOGY

## 2022-05-09 ENCOUNTER — TELEPHONE (OUTPATIENT)
Dept: UROLOGY | Age: 72
End: 2022-05-09

## 2022-05-09 ENCOUNTER — HOSPITAL ENCOUNTER (OUTPATIENT)
Dept: RADIATION ONCOLOGY | Age: 72
Discharge: HOME | End: 2022-05-09
Attending: RADIOLOGY
Payer: MEDICARE

## 2022-05-09 PROCEDURE — G6002 STEREOSCOPIC X-RAY GUIDANCE: HCPCS | Performed by: RADIOLOGY

## 2022-05-09 PROCEDURE — 77385 HC NTSTY MODUL RAD TX DLVR SMPL: CPT | Performed by: RADIOLOGY

## 2022-05-09 NOTE — TELEPHONE ENCOUNTER
Aline Gloria called into the office, wanted to know if any of the scans he has had would be the cause of him having a hernia.  Advised message would be sent to medical assistant to f/u with Dr. Farr, and someone would f/u

## 2022-05-10 ENCOUNTER — HOSPITAL ENCOUNTER (OUTPATIENT)
Dept: RADIATION ONCOLOGY | Age: 72
Discharge: HOME | End: 2022-05-10
Attending: RADIOLOGY
Payer: MEDICARE

## 2022-05-10 PROCEDURE — 77385 HC NTSTY MODUL RAD TX DLVR SMPL: CPT | Performed by: RADIOLOGY

## 2022-05-10 PROCEDURE — G6002 STEREOSCOPIC X-RAY GUIDANCE: HCPCS | Performed by: RADIOLOGY

## 2022-05-11 ENCOUNTER — HOSPITAL ENCOUNTER (OUTPATIENT)
Dept: RADIATION ONCOLOGY | Age: 72
Discharge: HOME | End: 2022-05-11
Attending: RADIOLOGY
Payer: MEDICARE

## 2022-05-11 VITALS
OXYGEN SATURATION: 99 % | RESPIRATION RATE: 14 BRPM | DIASTOLIC BLOOD PRESSURE: 81 MMHG | SYSTOLIC BLOOD PRESSURE: 151 MMHG | BODY MASS INDEX: 26.65 KG/M2 | WEIGHT: 202 LBS | HEART RATE: 72 BPM | TEMPERATURE: 96.8 F

## 2022-05-11 PROCEDURE — G6002 STEREOSCOPIC X-RAY GUIDANCE: HCPCS | Performed by: RADIOLOGY

## 2022-05-11 PROCEDURE — 77385 HC NTSTY MODUL RAD TX DLVR SMPL: CPT | Performed by: RADIOLOGY

## 2022-05-11 PROCEDURE — 77427 RADIATION TX MANAGEMENT X5: CPT | Performed by: RADIOLOGY

## 2022-05-11 NOTE — PROGRESS NOTES
Kaciefuentes Perez  5/11/2022  Wt Readings from Last 3 Encounters:   05/11/22 202 lb (91.6 kg)   05/04/22 204 lb 12.8 oz (92.9 kg)   04/27/22 205 lb (93 kg)     Body mass index is 26.65 kg/m². Treatment Area: Prostate    Patient was seen today for weekly visit. Comfort Alteration    Fatigue: Mild    Nutritional Alteration  Anorexia: No  Nausea: No  Vomiting: No     Elimination Alterations  Constipation: no  Diarrhea:  yes  Urinary Frequency/Urgency: Yes  Urinary Retention: No  Dysuria: Yes  Urinary Incontinence: No  Proctitis: No  Nocturia: Yes #/night: 3-4      Emotional  Coping: effective      Skin Alteration   Sensation  WDL    Radiation Dermatitis:  Intact [x]     Erythema  []     Discoloration  []     Rash []     Dry desquamation  []     Moist desquamation []           Injury, potential bleeding or infection:     Lab Results   Component Value Date    WBC 7.3 02/10/2016     02/10/2016         BP (!) 151/81   Pulse 72   Temp 96.8 °F (36 °C) (Temporal)   Resp 14   Wt 202 lb (91.6 kg)   SpO2 99%   BMI 26.65 kg/m²                   Assessment/Plan: Patient was seen today for weekly visit  Getting up 3-4 times per night and having a little more urgency and frequency with urination. Also more noticeable fatigue.       French Raygoza RN

## 2022-05-11 NOTE — PROGRESS NOTES
[address]       Radiation Oncology          [address]          [facility], [address]        Soheila Palencia: [phone]        F: [phone]       [facility]       Radiation Oncology   [address] [facility], [address]       Soheila Palencia: [phone]       F: [phone]       [facility]. [facility]          RADIATION ONCOLOGY WEEKLY PROGRESS NOTE  Patient ID:   Micah Hardy  : 1950   MRN: [id number]    Location:  [facility] Radiation Oncology,   [address] [facility], [place]   [phone]     DIAGNOSIS:  very low volume high risk prostate cancer [place] 4+4, PSA 5.28, [id number]    TREATMENT DETAILS:  Treatment Site: Prostate SV  Actual Dose: 4000cGy  Total Planned Dose: 7000cGy  Treatment Technique: IMRT  Fraction Technique: Daily  Therapy imaging monitoring: CBCT daily  Concurrent Chemotherapy: NA    SUBJECTIVE:   Patient seen for their weekly on treatment evaluation today. Patient is doing well though still has some increased urinary frequency. He does use cranberry juice. He has occasional dysuria but is not bothersome. He denies any issues with diarrhea or other symptoms. OBJECTIVE:   CHAPERONE: Declined    ECO Asymptomatic    VITAL SIGNS: BP (!) 151/81   Pulse 72   Temp 96.8 °F (36 °C) (Temporal)   Resp 14   Wt 202 lb (91.6 kg)   SpO2 99%   BMI 26.65 kg/m²   Wt Readings from Last 5 Encounters:   22 202 lb (91.6 kg)   22 204 lb 12.8 oz (92.9 kg)   22 205 lb (93 kg)   22 206 lb 9.6 oz (93.7 kg)   03/15/22 208 lb 9.6 oz (94.6 kg)     GENERAL:  General appearance is that of a well-nourished, well-developed in no apparent distress. LUNGS:  Pulmonary effort normal. Normal breath sounds. EXTREMITIES:  No edema. Normal ROM. NEUROLOGICAL: Alert and oriented. Strength and sensation intact bilaterally. No focal deficits.    PSYCH: Mood normal, behavior normal.  SKIN: No erythema, no desquamation. LABS:  WBC   Date Value Ref Range Status   02/10/2016 7.3 3.5 - 11.0 k/uL Final     Segs Absolute   Date Value Ref Range Status   02/10/2016 3.90 1.3 - 9.1 k/uL Final     Hemoglobin   Date Value Ref Range Status   02/10/2016 16.8 13.5 - 17.5 g/dL Final     Platelets   Date Value Ref Range Status   02/10/2016 209 150 - 450 k/uL Final     CREATININE   Date Value Ref Range Status   02/11/2019 0.98 0.70 - 1.20 mg/dL Final   02/09/2018 0.84 0.70 - 1.20 mg/dL Final   02/10/2017 0.98 0.70 - 1.20 mg/dL Final     POC Creatinine   Date Value Ref Range Status   03/22/2022 1.03 0.51 - 1.19 mg/dL Final   02/28/2022 1.03 0.51 - 1.19 mg/dL Final     No results found for: , CEA  PSA   Date Value Ref Range Status   12/10/2021 5.28 (H) <4.1 ug/L Final     Comment:     The Roche \"ECLIA\" assay is used. Results obtained with different assay methods cannot be   used interchangeably. 06/11/2021 4.90 (H) <4.1 ug/L Final     Comment:     The Roche \"ECLIA\" assay is used. Results obtained with different assay methods cannot be   used interchangeably. MEDICATIONS:    Current Outpatient Medications:     escitalopram (LEXAPRO) 10 MG tablet, TAKE 1 TABLET DAILY, Disp: 90 tablet, Rfl: 3    atorvastatin (LIPITOR) 80 MG tablet, Take 0.5 tablets by mouth nightly, Disp: 90 tablet, Rfl: 3    Omega-3 Fatty Acids (FISH OIL) 1000 MG CAPS, Take 4 capsules by mouth daily, Disp: 120 capsule, Rfl: 11    fluticasone (FLONASE) 50 MCG/ACT nasal spray, USE 1 SPRAY NASALLY DAILY, Disp: 48 g, Rfl: 3    Multiple Vitamins-Minerals (CENTRUM MEN PO), Take by mouth daily, Disp: , Rfl:     apixaban (ELIQUIS) 5 MG TABS tablet, Take by mouth 2 times daily, Disp: , Rfl:     metoprolol tartrate (LOPRESSOR) 50 MG tablet, Take 50 mg by mouth 2 times daily, Disp: , Rfl:       ASSESSMENT PLAN:   Treatment setup and plan reviewed. Port images/CBCT images reviewed. Appropriate laboratory work was reviewed. Treatment side effects and toxicities reviewed with the patient, and appropriate management was advised. Will continue radiation treatment as planned, and recommend patient contact us if they have any questions or concerns. Encourage patient to continue monitoring his urinary symptoms and to continue using cranberry juice. Electronically signed by Alee Nolan MD on 5/11/2022 at 2:23 PM      Drugs Prescribed:  New Prescriptions    No medications on file       Other Orders Placed:  No orders of the defined types were placed in this encounter.

## 2022-05-12 ENCOUNTER — HOSPITAL ENCOUNTER (OUTPATIENT)
Dept: RADIATION ONCOLOGY | Age: 72
Discharge: HOME | End: 2022-05-12
Attending: RADIOLOGY
Payer: MEDICARE

## 2022-05-12 PROCEDURE — G6002 STEREOSCOPIC X-RAY GUIDANCE: HCPCS | Performed by: RADIOLOGY

## 2022-05-12 PROCEDURE — 77385 HC NTSTY MODUL RAD TX DLVR SMPL: CPT | Performed by: RADIOLOGY

## 2022-05-13 ENCOUNTER — HOSPITAL ENCOUNTER (OUTPATIENT)
Dept: RADIATION ONCOLOGY | Age: 72
Discharge: HOME | End: 2022-05-13
Attending: RADIOLOGY
Payer: MEDICARE

## 2022-05-13 PROCEDURE — G6002 STEREOSCOPIC X-RAY GUIDANCE: HCPCS | Performed by: RADIOLOGY

## 2022-05-13 PROCEDURE — 77385 HC NTSTY MODUL RAD TX DLVR SMPL: CPT | Performed by: RADIOLOGY

## 2022-05-16 ENCOUNTER — HOSPITAL ENCOUNTER (OUTPATIENT)
Dept: RADIATION ONCOLOGY | Age: 72
Discharge: HOME | End: 2022-05-16
Attending: RADIOLOGY
Payer: MEDICARE

## 2022-05-16 PROCEDURE — 77385 HC NTSTY MODUL RAD TX DLVR SMPL: CPT | Performed by: RADIOLOGY

## 2022-05-16 PROCEDURE — G6002 STEREOSCOPIC X-RAY GUIDANCE: HCPCS | Performed by: RADIOLOGY

## 2022-05-17 ENCOUNTER — HOSPITAL ENCOUNTER (OUTPATIENT)
Dept: RADIATION ONCOLOGY | Age: 72
Discharge: HOME | End: 2022-05-17
Attending: RADIOLOGY
Payer: MEDICARE

## 2022-05-17 PROCEDURE — 77385 HC NTSTY MODUL RAD TX DLVR SMPL: CPT | Performed by: RADIOLOGY

## 2022-05-17 PROCEDURE — G6002 STEREOSCOPIC X-RAY GUIDANCE: HCPCS | Performed by: RADIOLOGY

## 2022-05-18 ENCOUNTER — HOSPITAL ENCOUNTER (OUTPATIENT)
Dept: RADIATION ONCOLOGY | Age: 72
Discharge: HOME | End: 2022-05-18
Attending: RADIOLOGY
Payer: MEDICARE

## 2022-05-18 VITALS
BODY MASS INDEX: 26.68 KG/M2 | WEIGHT: 202.2 LBS | DIASTOLIC BLOOD PRESSURE: 80 MMHG | OXYGEN SATURATION: 98 % | SYSTOLIC BLOOD PRESSURE: 150 MMHG | TEMPERATURE: 97.1 F | RESPIRATION RATE: 14 BRPM

## 2022-05-18 PROCEDURE — 77427 RADIATION TX MANAGEMENT X5: CPT | Performed by: RADIOLOGY

## 2022-05-18 PROCEDURE — G6002 STEREOSCOPIC X-RAY GUIDANCE: HCPCS | Performed by: RADIOLOGY

## 2022-05-18 PROCEDURE — 77385 HC NTSTY MODUL RAD TX DLVR SMPL: CPT | Performed by: RADIOLOGY

## 2022-05-18 PROCEDURE — 77336 RADIATION PHYSICS CONSULT: CPT | Performed by: RADIOLOGY

## 2022-05-18 NOTE — PROGRESS NOTES
Anup Coyle  5/18/2022  Wt Readings from Last 3 Encounters:   05/18/22 202 lb 3.2 oz (91.7 kg)   05/11/22 202 lb (91.6 kg)   05/04/22 204 lb 12.8 oz (92.9 kg)     Body mass index is 26.68 kg/m². Treatment Area: Prostate    Patient was seen today for weekly visit. Comfort Alteration    Fatigue: Mild    Nutritional Alteration  Anorexia: No  Nausea: No  Vomiting: No     Elimination Alterations  Constipation: no  Diarrhea:  no  Urinary Frequency/Urgency: Yes  Urinary Retention: No  Dysuria: Yes  Urinary Incontinence: No  Proctitis: No  Nocturia: Yes #/night: 3-4      Emotional  Coping: effective      Skin Alteration   Sensation  WDL    Radiation Dermatitis:  Intact [x]     Erythema  []     Discoloration  []     Rash []     Dry desquamation  []     Moist desquamation []           Injury, potential bleeding or infection:     Lab Results   Component Value Date    WBC 7.3 02/10/2016     02/10/2016         BP (!) 150/80   Temp 97.1 °F (36.2 °C) (Temporal)   Resp 14   Wt 202 lb 3.2 oz (91.7 kg)   SpO2 98%   BMI 26.68 kg/m²                   Assessment/Plan: Patient was seen today for weekly visit  Getting up 3-4 times per night and having a little more urgency and frequency with urination. Does not want to be on any medication for urinary symptoms at this time and states symptoms are manageable.       Tommy Crowe RN

## 2022-05-18 NOTE — PROGRESS NOTES
[address]       Radiation Oncology          [address]          Kassandra Jeter, [address]        Gina Lambert: [phone]        F: [phone]       [employer] Company [address] [place] [address]       Radiation Oncology   [address] [facility], [address]       Gina Lambert: [phone]       F: [phone]       [email or web address]          RADIATION ONCOLOGY WEEKLY PROGRESS NOTE  Patient ID:   Mark Cook  : 1950   MRN: [id number]    Location:  [facility] Radiation Oncology,   [address] Pattie Fitzgerald   [phone]     DIAGNOSIS:  very low volume high risk prostate cancer [place] 4+4, PSA 5.28, [id number]    TREATMENT DETAILS:  Treatment Site: Prostate SV  Actual Dose: 5250cGy  Total Planned Dose: 7000cGy  Treatment Technique: IMRT  Fraction Technique: Daily  Therapy imaging monitoring: CBCT daily  Concurrent Chemotherapy: NA    SUBJECTIVE:   Patient seen for their weekly on treatment evaluation today. Patient is doing well though still has some increased urinary frequency. He does use cranberry juice. He has occasional dysuria but is not bothersome. He denies any issues with diarrhea or other symptoms. He does feel an inguinal hernia on his right side that's bothering him, and had a mesh repair done 30-40yrs ago. OBJECTIVE:   CHAPERONE: Declined    ECO Asymptomatic    VITAL SIGNS: BP (!) 150/80   Temp 97.1 °F (36.2 °C) (Temporal)   Resp 14   Wt 202 lb 3.2 oz (91.7 kg)   SpO2 98%   BMI 26.68 kg/m²   Wt Readings from Last 5 Encounters:   22 202 lb 3.2 oz (91.7 kg)   22 202 lb (91.6 kg)   22 204 lb 12.8 oz (92.9 kg)   22 205 lb (93 kg)   22 206 lb 9.6 oz (93.7 kg)     GENERAL:  General appearance is that of a well-nourished, well-developed in no apparent distress. LUNGS:  Pulmonary effort normal. Normal breath sounds. EXTREMITIES:  No edema. Normal ROM.   NEUROLOGICAL: Alert and oriented. Strength and sensation intact bilaterally. No focal deficits. PSYCH: Mood normal, behavior normal.  SKIN: No erythema, no desquamation. LABS:  WBC   Date Value Ref Range Status   02/10/2016 7.3 3.5 - 11.0 k/uL Final     Segs Absolute   Date Value Ref Range Status   02/10/2016 3.90 1.3 - 9.1 k/uL Final     Hemoglobin   Date Value Ref Range Status   02/10/2016 16.8 13.5 - 17.5 g/dL Final     Platelets   Date Value Ref Range Status   02/10/2016 209 150 - 450 k/uL Final     CREATININE   Date Value Ref Range Status   02/11/2019 0.98 0.70 - 1.20 mg/dL Final   02/09/2018 0.84 0.70 - 1.20 mg/dL Final   02/10/2017 0.98 0.70 - 1.20 mg/dL Final     POC Creatinine   Date Value Ref Range Status   03/22/2022 1.03 0.51 - 1.19 mg/dL Final   02/28/2022 1.03 0.51 - 1.19 mg/dL Final     No results found for: , CEA  PSA   Date Value Ref Range Status   12/10/2021 5.28 (H) <4.1 ug/L Final     Comment:     The Roche \"ECLIA\" assay is used. Results obtained with different assay methods cannot be   used interchangeably. 06/11/2021 4.90 (H) <4.1 ug/L Final     Comment:     The Roche \"ECLIA\" assay is used. Results obtained with different assay methods cannot be   used interchangeably.          MEDICATIONS:    Current Outpatient Medications:     escitalopram (LEXAPRO) 10 MG tablet, TAKE 1 TABLET DAILY, Disp: 90 tablet, Rfl: 3    atorvastatin (LIPITOR) 80 MG tablet, Take 0.5 tablets by mouth nightly, Disp: 90 tablet, Rfl: 3    Omega-3 Fatty Acids (FISH OIL) 1000 MG CAPS, Take 4 capsules by mouth daily, Disp: 120 capsule, Rfl: 11    fluticasone (FLONASE) 50 MCG/ACT nasal spray, USE 1 SPRAY NASALLY DAILY, Disp: 48 g, Rfl: 3    Multiple Vitamins-Minerals (CENTRUM MEN PO), Take by mouth daily, Disp: , Rfl:     apixaban (ELIQUIS) 5 MG TABS tablet, Take by mouth 2 times daily, Disp: , Rfl:     metoprolol tartrate (LOPRESSOR) 50 MG tablet, Take 50 mg by mouth 2 times daily, Disp: , Rfl:       ASSESSMENT PLAN: Treatment setup and plan reviewed. Port images/CBCT images reviewed. Appropriate laboratory work was reviewed. Treatment side effects and toxicities reviewed with the patient, and appropriate management was advised. Will continue radiation treatment as planned, and recommend patient contact us if they have any questions or concerns. Encourage patient to continue monitoring his urinary symptoms and to continue using cranberry juice. He is going to see his PCP for referral for hernia repair. Electronically signed by Eileen Gill MD on 5/18/2022 at 12:13 PM      Drugs Prescribed:  New Prescriptions    No medications on file       Other Orders Placed:  No orders of the defined types were placed in this encounter.

## 2022-05-19 ENCOUNTER — HOSPITAL ENCOUNTER (OUTPATIENT)
Dept: RADIATION ONCOLOGY | Age: 72
Discharge: HOME | End: 2022-05-19
Attending: RADIOLOGY
Payer: MEDICARE

## 2022-05-19 PROCEDURE — 77385 HC NTSTY MODUL RAD TX DLVR SMPL: CPT | Performed by: RADIOLOGY

## 2022-05-19 PROCEDURE — G6002 STEREOSCOPIC X-RAY GUIDANCE: HCPCS | Performed by: RADIOLOGY

## 2022-05-20 ENCOUNTER — HOSPITAL ENCOUNTER (OUTPATIENT)
Dept: RADIATION ONCOLOGY | Age: 72
Discharge: HOME | End: 2022-05-20
Attending: RADIOLOGY
Payer: MEDICARE

## 2022-05-20 PROCEDURE — 77385 HC NTSTY MODUL RAD TX DLVR SMPL: CPT | Performed by: RADIOLOGY

## 2022-05-20 PROCEDURE — G6002 STEREOSCOPIC X-RAY GUIDANCE: HCPCS | Performed by: RADIOLOGY

## 2022-05-23 ENCOUNTER — HOSPITAL ENCOUNTER (OUTPATIENT)
Dept: RADIATION ONCOLOGY | Age: 72
Discharge: HOME | End: 2022-05-23
Payer: MEDICARE

## 2022-05-23 PROCEDURE — 77385 HC NTSTY MODUL RAD TX DLVR SMPL: CPT | Performed by: RADIOLOGY

## 2022-05-23 PROCEDURE — G6002 STEREOSCOPIC X-RAY GUIDANCE: HCPCS | Performed by: RADIOLOGY

## 2022-05-24 ENCOUNTER — HOSPITAL ENCOUNTER (OUTPATIENT)
Dept: RADIATION ONCOLOGY | Age: 72
Discharge: HOME | End: 2022-05-24
Payer: MEDICARE

## 2022-05-24 PROCEDURE — 77385 HC NTSTY MODUL RAD TX DLVR SMPL: CPT | Performed by: RADIOLOGY

## 2022-05-24 PROCEDURE — G6002 STEREOSCOPIC X-RAY GUIDANCE: HCPCS | Performed by: RADIOLOGY

## 2022-05-25 ENCOUNTER — HOSPITAL ENCOUNTER (OUTPATIENT)
Dept: RADIATION ONCOLOGY | Age: 72
Discharge: HOME | End: 2022-05-25
Payer: MEDICARE

## 2022-05-25 ENCOUNTER — HOSPITAL ENCOUNTER (OUTPATIENT)
Dept: RADIATION ONCOLOGY | Age: 72
Discharge: HOME | End: 2022-05-25
Attending: RADIOLOGY
Payer: MEDICARE

## 2022-05-25 VITALS
TEMPERATURE: 97.3 F | BODY MASS INDEX: 26.36 KG/M2 | OXYGEN SATURATION: 97 % | WEIGHT: 199.8 LBS | SYSTOLIC BLOOD PRESSURE: 161 MMHG | DIASTOLIC BLOOD PRESSURE: 113 MMHG | HEART RATE: 69 BPM | RESPIRATION RATE: 14 BRPM

## 2022-05-25 DIAGNOSIS — C61 PROSTATE CANCER (HCC): Primary | ICD-10-CM

## 2022-05-25 PROCEDURE — G6002 STEREOSCOPIC X-RAY GUIDANCE: HCPCS | Performed by: RADIOLOGY

## 2022-05-25 PROCEDURE — 77427 RADIATION TX MANAGEMENT X5: CPT | Performed by: RADIOLOGY

## 2022-05-25 PROCEDURE — 77385 HC NTSTY MODUL RAD TX DLVR SMPL: CPT | Performed by: RADIOLOGY

## 2022-05-25 PROCEDURE — 77336 RADIATION PHYSICS CONSULT: CPT | Performed by: RADIOLOGY

## 2022-05-25 NOTE — PROGRESS NOTES
Brad Joe  5/25/2022  Wt Readings from Last 3 Encounters:   05/25/22 199 lb 12.8 oz (90.6 kg)   05/18/22 202 lb 3.2 oz (91.7 kg)   05/11/22 202 lb (91.6 kg)     Body mass index is 26.36 kg/m². Treatment Area: Prostate    Patient was seen today for weekly visit. Comfort Alteration    Fatigue: Mild    Nutritional Alteration  Anorexia: No  Nausea: No  Vomiting: No     Elimination Alterations  Constipation: no  Diarrhea:  no  Urinary Frequency/Urgency: Yes  Urinary Retention: No  Dysuria: Yes  Urinary Incontinence: No  Proctitis: No  Nocturia: Yes-up every hour     Emotional  Coping: effective      Skin Alteration   Sensation  WDL    Radiation Dermatitis:  Intact [x]     Erythema  []     Discoloration  []     Rash []     Dry desquamation  []     Moist desquamation []           Injury, potential bleeding or infection:     Lab Results   Component Value Date    WBC 7.3 02/10/2016     02/10/2016         BP (!) 161/113   Pulse 69   Temp 97.3 °F (36.3 °C) (Temporal)   Resp 14   Wt 199 lb 12.8 oz (90.6 kg)   SpO2 97%   BMI 26.36 kg/m²                   Assessment/Plan: Patient was seen today for weekly visit   Does not want to be on any medication for urinary symptoms at this time and states symptoms are manageable.       Anthony Diaz RN

## 2022-05-25 NOTE — PROGRESS NOTES
[address]  Fax [phone] [address] [facility] WEEKLY PROGRESS NOTE  Patient ID:   Tyna Cockayne  : 1950   MRN: [id number]    DIAGNOSIS:  Cancer Staging  No matching staging information was found for the patient. TREATMENT DETAILS:  Treatment Site: prostate   Actual Dose: 6500 cGy  Total Planned Dose: 7000 cGy  Treatment Technique: IMRT  Fraction Technique: Daily  Therapy imaging monitoring: CBCT daily  Concurrent Chemotherapy: no    SUBJECTIVE:   Patient seen for their weekly on treatment evaluation today. Some frequency and nocturia but doesn't want to take flomax at this time. No major bowel problems. OBJECTIVE:   CHAPERONE: Not Required    ECO Asymptomatic    VITAL SIGNS: BP (!) 161/113   Pulse 69   Temp 97.3 °F (36.3 °C) (Temporal)   Resp 14   Wt 199 lb 12.8 oz (90.6 kg)   SpO2 97%   BMI 26.36 kg/m²   Wt Readings from Last 5 Encounters:   22 199 lb 12.8 oz (90.6 kg)   22 202 lb 3.2 oz (91.7 kg)   22 202 lb (91.6 kg)   22 204 lb 12.8 oz (92.9 kg)   22 205 lb (93 kg)     GENERAL:  General appearance is that of a well-nourished, well-developed in no apparent distress. HEENT: Normocephalic, atraumatic, EOMI, moist mucosa, no erythema. Indirect exam .  NECK:  No adenopathy or a palpable thyroid mass, trachea is midline. LYMPHATICS: No cervical, supraclavicular, or axillary adenopathy.       LABS:  WBC   Date Value Ref Range Status   02/10/2016 7.3 3.5 - 11.0 k/uL Final     Segs Absolute   Date Value Ref Range Status   02/10/2016 3.90 1.3 - 9.1 k/uL Final     Hemoglobin   Date Value Ref Range Status   02/10/2016 16.8 13.5 - 17.5 g/dL Final     Platelets   Date Value Ref Range Status   02/10/2016 209 150 - 450 k/uL Final     CREATININE   Date Value Ref Range Status   2019 0.98 0.70 - 1.20 mg/dL Final   2018 0.84 0.70 - 1.20 mg/dL Final   02/10/2017 0.98 0.70 - 1.20 mg/dL Final     POC Creatinine   Date Value Ref Range Status   03/22/2022 1.03 0.51 - 1.19 mg/dL Final   02/28/2022 1.03 0.51 - 1.19 mg/dL Final     No results found for: , CEA  PSA   Date Value Ref Range Status   12/10/2021 5.28 (H) <4.1 ug/L Final     Comment:     The Roche \"ECLIA\" assay is used. Results obtained with different assay methods cannot be   used interchangeably. 06/11/2021 4.90 (H) <4.1 ug/L Final     Comment:     The Roche \"ECLIA\" assay is used. Results obtained with different assay methods cannot be   used interchangeably. MEDICATIONS:    Current Outpatient Medications:     escitalopram (LEXAPRO) 10 MG tablet, TAKE 1 TABLET DAILY, Disp: 90 tablet, Rfl: 3    atorvastatin (LIPITOR) 80 MG tablet, Take 0.5 tablets by mouth nightly, Disp: 90 tablet, Rfl: 3    Omega-3 Fatty Acids (FISH OIL) 1000 MG CAPS, Take 4 capsules by mouth daily, Disp: 120 capsule, Rfl: 11    fluticasone (FLONASE) 50 MCG/ACT nasal spray, USE 1 SPRAY NASALLY DAILY, Disp: 48 g, Rfl: 3    Multiple Vitamins-Minerals (CENTRUM MEN PO), Take by mouth daily, Disp: , Rfl:     apixaban (ELIQUIS) 5 MG TABS tablet, Take by mouth 2 times daily, Disp: , Rfl:     metoprolol tartrate (LOPRESSOR) 50 MG tablet, Take 50 mg by mouth 2 times daily, Disp: , Rfl:       ASSESSMENT PLAN:     Continue radiation. Finishes soon. Follow up 4-6 weeks with PSA or sooner if needed. Treatment setup and plan reviewed. Port images/CBCT images reviewed. Appropriate laboratory work was reviewed. Treatment side effects and toxicities reviewed with the patient, and appropriate management was advised. Will continue radiation treatment as planned, and recommend patient contact us if they have any questions or concerns.     Electronically signed by Teagan Moon MD on 5/25/2022 at 12:16 PM      Drugs Prescribed:  New Prescriptions    No medications on file       Other Orders Placed:  Orders Placed This Encounter   Procedures    PSA, Diagnostic

## 2022-05-26 ENCOUNTER — HOSPITAL ENCOUNTER (OUTPATIENT)
Dept: RADIATION ONCOLOGY | Age: 72
Discharge: HOME | End: 2022-05-26
Payer: MEDICARE

## 2022-05-26 PROCEDURE — 77385 HC NTSTY MODUL RAD TX DLVR SMPL: CPT | Performed by: RADIOLOGY

## 2022-05-26 PROCEDURE — G6002 STEREOSCOPIC X-RAY GUIDANCE: HCPCS | Performed by: RADIOLOGY

## 2022-05-27 ENCOUNTER — HOSPITAL ENCOUNTER (OUTPATIENT)
Dept: RADIATION ONCOLOGY | Age: 72
Discharge: HOME | End: 2022-05-27
Payer: MEDICARE

## 2022-05-27 PROCEDURE — G6002 STEREOSCOPIC X-RAY GUIDANCE: HCPCS | Performed by: RADIOLOGY

## 2022-05-27 PROCEDURE — 77385 HC NTSTY MODUL RAD TX DLVR SMPL: CPT | Performed by: RADIOLOGY

## 2022-05-27 PROCEDURE — 77336 RADIATION PHYSICS CONSULT: CPT | Performed by: RADIOLOGY

## 2022-06-23 ENCOUNTER — HOSPITAL ENCOUNTER (OUTPATIENT)
Age: 72
Discharge: HOME | End: 2022-06-23
Payer: MEDICARE

## 2022-06-23 DIAGNOSIS — C61 PROSTATE CANCER (HCC): ICD-10-CM

## 2022-06-23 LAB — PROSTATE SPECIFIC ANTIGEN: 3.11 NG/ML

## 2022-06-23 PROCEDURE — 84153 ASSAY OF PSA TOTAL: CPT

## 2022-06-23 PROCEDURE — 36415 COLL VENOUS BLD VENIPUNCTURE: CPT

## 2022-06-27 ENCOUNTER — HOSPITAL ENCOUNTER (OUTPATIENT)
Dept: RADIATION ONCOLOGY | Age: 72
Discharge: HOME | End: 2022-06-27
Attending: RADIOLOGY
Payer: MEDICARE

## 2022-06-27 VITALS
RESPIRATION RATE: 16 BRPM | TEMPERATURE: 97.7 F | OXYGEN SATURATION: 99 % | HEART RATE: 93 BPM | WEIGHT: 201 LBS | DIASTOLIC BLOOD PRESSURE: 80 MMHG | BODY MASS INDEX: 26.52 KG/M2 | SYSTOLIC BLOOD PRESSURE: 166 MMHG

## 2022-06-27 PROCEDURE — 99212 OFFICE O/P EST SF 10 MIN: CPT | Performed by: RADIOLOGY

## 2022-06-27 NOTE — PROGRESS NOTES
Geovanna Neighbours  6/27/2022  2:20 PM      Vitals:    06/27/22 1415   BP: (!) 166/80   Pulse: 93   Resp: 16   Temp: 97.7 °F (36.5 °C)   SpO2: 99%    :     Pain Assessment: None - Denies Pain          Wt Readings from Last 1 Encounters:   06/27/22 201 lb (91.2 kg)                Current Outpatient Medications:     escitalopram (LEXAPRO) 10 MG tablet, TAKE 1 TABLET DAILY, Disp: 90 tablet, Rfl: 3    atorvastatin (LIPITOR) 80 MG tablet, Take 0.5 tablets by mouth nightly, Disp: 90 tablet, Rfl: 3    Omega-3 Fatty Acids (FISH OIL) 1000 MG CAPS, Take 4 capsules by mouth daily, Disp: 120 capsule, Rfl: 11    fluticasone (FLONASE) 50 MCG/ACT nasal spray, USE 1 SPRAY NASALLY DAILY, Disp: 48 g, Rfl: 3    Multiple Vitamins-Minerals (CENTRUM MEN PO), Take by mouth daily, Disp: , Rfl:     apixaban (ELIQUIS) 5 MG TABS tablet, Take by mouth 2 times daily, Disp: , Rfl:     metoprolol tartrate (LOPRESSOR) 50 MG tablet, Take 50 mg by mouth 2 times daily, Disp: , Rfl:                FALLS RISK SCREEN  Instructions:  Assess the patient and enter the appropriate indicators that are present for fall risk identification. Total the numbers entered and assign a fall risk score from Table 2.  Reassess patient at a minimum every 12 weeks or with status change. Assessment   Date  6/27/2022     1. Mental Ability: confusion/cognitively impaired 0     2. Elimination Issues: incontinence, frequency 0       3. Ambulatory: use of assistive devices (walker, cane, off-loading devices),        attached to equipment (IV pole, oxygen) 0     4. Sensory Limitations: dizziness, vertigo, impaired vision 0     5. Age less than 65        0     6. Age 72 or greater 1     7. Medication: diuretics, strong analgesics, hypnotics, sedatives,        antihypertensive agents 3   8. Falls:  recent history of falls within the last 3 months (not to include slipping or        tripping) 0   TOTAL 4    If score of 4 or greater was education given?  Yes TABLE 2   Risk Score Risk Level Plan of Care   0-3 Little or  No Risk 1. Provide assistance as indicated for ambulation activities  2. Reorient confused/cognitively impaired patient  3. Chair/bed in low position, stretcher/bed with siderails up except when performing patient care activities  5. Educate patient/family/caregiver on falls prevention  6.  Reassess in 12 weeks or with any noted change in patient condition which places them at a risk for a fall   4-6 Moderate Risk 1. Provide assistance as indicated for ambulation activities  2. Reorient confused/cognitively impaired patient  3. Chair/bed in low position, stretcher/bed with siderails up except when performing patient care activities  4. Educate patient/family/caregiver on falls prevention     7 or   Higher High Risk 1. Place patient in easily observable treatment room  2. Patient attended at all times by family member or staff  3. Provide assistance as indicated for ambulation activities  4. Reorient confused/cognitively impaired patient  5. Chair/bed in low position, stretcher/bed with siderails up except when performing patient care activities  6. Educate patient/family/caregiver on falls prevention         PLAN: Patient is seen today in follow up. Notes improving side effects from previous treatment. Urinating with no issues. Notes some bilateral leg tightness and discomfort. He will follow up with his PCP regarding that issue. Dr. Eladio De La Cruz updated and examined pt per MD pt will follow up in 4 months with PSA prior.          Chloe Pedro RN

## 2022-06-27 NOTE — PROGRESS NOTES
[place] [address]            Radiation Oncology          [address]          [place], [address]        Mireille Le: [phone]        F: [phone]       [email or web address]         Date of Service: 2022     Location:  [facility],   [address] [place], [place]   [phone]        [address] [place] [address] FOLLOW UP NOTE    Patient ID:   Kelsey Gonzalez  : 1950   MRN: [id number]    DIAGNOSIS: low volume high risk prostate cancer sergio 4+4 (1 core of 3 positive), PSA 5.28, [id number]    INTERVAL HISTORY:   Kelsey Gonzalez is a 80 y.o.. male who presents for follow the above diagnosis and treatment history. He completed treatment about a month ago. He denies any new dysuria, hematuria, urgency, or other problems his bladder. He is not any problems with his bowels. His only needs to be diarrhea or constipation. Bladder irritation he was having during treatment has calmed down significantly. He is not taking any medications for his bladder at this time and did not require any during treatment. PSA was 3.11 on 22.      MEDICATIONS:    Current Outpatient Medications:     escitalopram (LEXAPRO) 10 MG tablet, TAKE 1 TABLET DAILY, Disp: 90 tablet, Rfl: 3    atorvastatin (LIPITOR) 80 MG tablet, Take 0.5 tablets by mouth nightly, Disp: 90 tablet, Rfl: 3    Omega-3 Fatty Acids (FISH OIL) 1000 MG CAPS, Take 4 capsules by mouth daily, Disp: 120 capsule, Rfl: 11    fluticasone (FLONASE) 50 MCG/ACT nasal spray, USE 1 SPRAY NASALLY DAILY, Disp: 48 g, Rfl: 3    Multiple Vitamins-Minerals (CENTRUM MEN PO), Take by mouth daily, Disp: , Rfl:     apixaban (ELIQUIS) 5 MG TABS tablet, Take by mouth 2 times daily, Disp: , Rfl:     metoprolol tartrate (LOPRESSOR) 50 MG tablet, Take 50 mg by mouth 2 times daily, Disp: , Rfl:     ALLERGIES:  Allergies   Allergen Reactions    Sulfa Antibiotics          REVIEW OF SYSTEMS:    A full 14 point different assay methods cannot be   used interchangeably. PSA 3.11 6/23/22  PSA 5.28 12/10/21     IMAGING:   None new       ASSESSMENT AND PLAN:  Quinn Spicer is a 80 y.o. male with low volume high risk prostate cancer sp definitive radiation therapy 70Gy/28fx completed 5/27/22 who presents for follow up. He is healing well from acute toxicities of radiation therapy. We will see him back in 4 months with another PSA. He knows to call anytime with any questions or concerns may arise in interim. Patient is recommended to come back in 4 months for another follow up visit and exam, or can call to come see us sooner if symptoms change. Patient was in agreement with my recommendations. All questions were answered to their satisfaction. Patient was advised to contact us anytime should they have any questions or concerns. Electronically signed by Cherie Mcnair MD on 6/27/2022 at 2:32 PM        Medications Prescribed:   New Prescriptions    No medications on file       Orders: No orders of the defined types were placed in this encounter.       CC:  Patient Care Team:  Laurie Talamantes MD as PCP - General (Family Medicine)  Laurie Talaamntes MD as PCP - [facility] Provider

## 2022-06-28 ENCOUNTER — OFFICE VISIT (OUTPATIENT)
Dept: UROLOGY | Age: 72
End: 2022-06-28
Payer: MEDICARE

## 2022-06-28 VITALS
DIASTOLIC BLOOD PRESSURE: 85 MMHG | WEIGHT: 201 LBS | HEIGHT: 73 IN | HEART RATE: 78 BPM | TEMPERATURE: 98.5 F | BODY MASS INDEX: 26.64 KG/M2 | SYSTOLIC BLOOD PRESSURE: 124 MMHG

## 2022-06-28 DIAGNOSIS — R30.0 DYSURIA: ICD-10-CM

## 2022-06-28 DIAGNOSIS — C61 PROSTATE CANCER (HCC): Primary | ICD-10-CM

## 2022-06-28 PROCEDURE — 1123F ACP DISCUSS/DSCN MKR DOCD: CPT | Performed by: UROLOGY

## 2022-06-28 PROCEDURE — 3017F COLORECTAL CA SCREEN DOC REV: CPT | Performed by: UROLOGY

## 2022-06-28 PROCEDURE — 1036F TOBACCO NON-USER: CPT | Performed by: UROLOGY

## 2022-06-28 PROCEDURE — 99213 OFFICE O/P EST LOW 20 MIN: CPT | Performed by: UROLOGY

## 2022-06-28 PROCEDURE — G8417 CALC BMI ABV UP PARAM F/U: HCPCS | Performed by: UROLOGY

## 2022-06-28 PROCEDURE — G8427 DOCREV CUR MEDS BY ELIG CLIN: HCPCS | Performed by: UROLOGY

## 2022-06-28 ASSESSMENT — ENCOUNTER SYMPTOMS
EYE PAIN: 0
DIARRHEA: 0
CONSTIPATION: 0
ABDOMINAL PAIN: 0
VOMITING: 0
EYE REDNESS: 0
WHEEZING: 0
RESPIRATORY NEGATIVE: 1
SHORTNESS OF BREATH: 0
GASTROINTESTINAL NEGATIVE: 1
EYES NEGATIVE: 1
BACK PAIN: 0
COUGH: 0
NAUSEA: 0

## 2022-06-28 NOTE — LETTER
[address] [address]  Dept: [phone]  Dept Fax: [phone]        6/28/22    Patient: Kacie Perez  YOB: 1950    Dear Xin Polanco MD,    I had the pleasure of seeing one of your patients, Jey Donovan today in the office today. Below are the relevant portions of my assessment and plan of care. IMPRESSION:  1. Prostate cancer ([address]    2. Dysuria        PLAN:  He just finished XRT and is doing well. He had some dysuria, which has resolved. PSA is improving. He decided to not have ADT  F/U in 4 months with a PSA. Return in about 4 months (around 10/28/2022). Prescriptions Ordered:  No orders of the defined types were placed in this encounter. Orders Placed:  Orders Placed This Encounter   Procedures    PSA, Diagnostic     Standing Status:   Future     Standing Expiration Date:   6/28/2023        Thank you for allowing me to participate in the care of this patient. I will keep you updated on this patient's follow up and I look forward to serving you and your patients again in the future.         Jayde Landry MD

## 2022-06-28 NOTE — PROGRESS NOTES
[address] [address]  Dept: [address] [facility] Urology Office Note - Established    Patient:  Brad Diaz  YOB: 1950  Date: 6/28/2022    The patient is a 80 y.o. male who presents todayfor evaluation of the following problems:   Chief Complaint   Patient presents with    3 Month Follow-Up       HPI  He is here in follow up for prostate cancer. He just finished XRT. His path was George 7 and 8. He did not have Eligard. He discussed it with rad onc. He is voiding better. Summary of old records: N/A    Additional History: N/A    Procedures Today: N/A    Urinalysis today:  No results found for this visit on 06/28/22. Last several PSA's:  Lab Results   Component Value Date    PSA 3.11 06/23/2022    PSA 5.28 (H) 12/10/2021    PSA 4.90 (H) 06/11/2021     Last total testosterone:  No results found for: TESTOSTERONE    AUA Symptom Score (6/28/2022): Last BUN and creatinine:  Lab Results   Component Value Date    BUN 15 02/11/2019     Lab Results   Component Value Date    CREATININE 1.03 03/22/2022       Additional Lab/Culture results: none    Imaging Reviewed during this Office Visit: none  (results were independently reviewed by physician and radiology report verified)    PAST MEDICAL, FAMILY AND SOCIAL HISTORY UPDATE:  Past Medical History:   Diagnosis Date    A-fib ([facility]     Alcohol abuse 2/9/2016    BMI 26.0-26.9,adult 8/6/2015    Diverticulosis     Hyperlipidemia LDL goal < 130 8/6/2015    Hypertension     Mixed hyperlipidemia 8/6/2015    replace inactive diagnosis    Overweight (BMI 25.0-29. 9) 8/6/2015    Sinusitis      Past Surgical History:   Procedure Laterality Date    COLONOSCOPY      10 YEARS AGO    HERNIA REPAIR      RIGHT INGUINAL     No family history on file.   Outpatient Medications Marked as Taking for the 6/28/22 encounter (Office Visit) with Tyra Vargas MD   Medication Sig Dispense Refill    escitalopram (LEXAPRO) 10 MG tablet TAKE 1 TABLET DAILY 90 tablet 3    atorvastatin (LIPITOR) 80 MG tablet Take 0.5 tablets by mouth nightly 90 tablet 3    Omega-3 Fatty Acids (FISH OIL) 1000 MG CAPS Take 4 capsules by mouth daily 120 capsule 11    fluticasone (FLONASE) 50 MCG/ACT nasal spray USE 1 SPRAY NASALLY DAILY 48 g 3    Multiple Vitamins-Minerals (CENTRUM MEN PO) Take by mouth daily      apixaban (ELIQUIS) 5 MG TABS tablet Take by mouth 2 times daily      metoprolol tartrate (LOPRESSOR) 50 MG tablet Take 50 mg by mouth 2 times daily         Sulfa antibiotics  Social History     Tobacco Use   Smoking Status Never Smoker   Smokeless Tobacco Never Used     (Ifpatient a smoker, smoking cessation counseling offered)    Social History     Substance and Sexual Activity   Alcohol Use Yes    Alcohol/week: 0.0 standard drinks    Comment: per pt he binge drinks on the weekends        REVIEW OF SYSTEMS:  Review of Systems    Physical Exam:      Vitals:    06/28/22 1113   BP: 124/85   Pulse: 78   Temp: 98.5 °F (36.9 °C)     Body mass index is 26.52 kg/m². Patient is a 80 y.o. male in no acute distress and alert and oriented to person, place and time. Physical Exam  Constitutional: Patient in no acute distress. Neuro: Alert and oriented to person, place and time. Psych: Mood normal, affect normal  Skin: No rash noted  Lungs: Respiratory effort is normal  Cardiovascular: Warm & Pink  Abdomen: Soft, non-tender, non-distended with no CVA,  No flank tenderness,  Or hepatosplenomegaly   Lymphatics: No palpablelymphadenopathy. Bladder non-tender and not distended. Musculoskeletal: Normal gait and station      Assessment and Plan      1. Prostate cancer ([address]    2. Dysuria       dysuria is new, but has nearly resolved. Plan:          He just finished XRT and is doing well. He had some dysuria, which has resolved.    PSA is improving. He decided to not have ADT  Discussed OTC AZO for dysuria going forward. F/U in 4 months with a PSA. Return in about 4 months (around 10/28/2022). Prescriptions Ordered:  No orders of the defined types were placed in this encounter. Orders Placed:  Orders Placed This Encounter   Procedures    PSA, Diagnostic     Standing Status:   Future     Standing Expiration Date:   6/28/2023           Mariela Mora MD    Agree with the ROS entered by the MA.

## 2022-10-21 ENCOUNTER — HOSPITAL ENCOUNTER (OUTPATIENT)
Age: 72
Discharge: HOME | End: 2022-10-21
Payer: MEDICARE

## 2022-10-21 DIAGNOSIS — C61 PROSTATE CANCER (HCC): ICD-10-CM

## 2022-10-21 LAB — PROSTATE SPECIFIC ANTIGEN: 1.47 NG/ML

## 2022-10-21 PROCEDURE — 36415 COLL VENOUS BLD VENIPUNCTURE: CPT

## 2022-10-21 PROCEDURE — 84153 ASSAY OF PSA TOTAL: CPT

## 2022-10-27 ENCOUNTER — HOSPITAL ENCOUNTER (OUTPATIENT)
Dept: RADIATION ONCOLOGY | Age: 72
Discharge: HOME | End: 2022-10-27
Attending: RADIOLOGY
Payer: MEDICARE

## 2022-10-27 VITALS
HEART RATE: 93 BPM | RESPIRATION RATE: 14 BRPM | WEIGHT: 204 LBS | DIASTOLIC BLOOD PRESSURE: 84 MMHG | TEMPERATURE: 97.2 F | SYSTOLIC BLOOD PRESSURE: 165 MMHG | BODY MASS INDEX: 26.91 KG/M2

## 2022-10-27 DIAGNOSIS — C61 PROSTATE CANCER (HCC): Primary | ICD-10-CM

## 2022-10-27 PROCEDURE — 99212 OFFICE O/P EST SF 10 MIN: CPT | Performed by: RADIOLOGY

## 2022-10-27 PROCEDURE — 99214 OFFICE O/P EST MOD 30 MIN: CPT | Performed by: RADIOLOGY

## 2022-10-27 NOTE — PROGRESS NOTES
Mariangel Branch  10/27/2022  11:10 AM      Vitals:    10/27/22 1100   BP: (!) 165/84   Pulse: 93   Resp: 14   Temp: 97.2 °F (36.2 °C)    :     Pain Assessment: None - Denies Pain          Wt Readings from Last 1 Encounters:   10/27/22 204 lb (92.5 kg)                Current Outpatient Medications:     escitalopram (LEXAPRO) 10 MG tablet, TAKE 1 TABLET DAILY, Disp: 90 tablet, Rfl: 3    atorvastatin (LIPITOR) 80 MG tablet, Take 0.5 tablets by mouth nightly, Disp: 90 tablet, Rfl: 3    Omega-3 Fatty Acids (FISH OIL) 1000 MG CAPS, Take 4 capsules by mouth daily, Disp: 120 capsule, Rfl: 11    fluticasone (FLONASE) 50 MCG/ACT nasal spray, USE 1 SPRAY NASALLY DAILY, Disp: 48 g, Rfl: 3    Multiple Vitamins-Minerals (CENTRUM MEN PO), Take by mouth daily, Disp: , Rfl:     apixaban (ELIQUIS) 5 MG TABS tablet, Take by mouth 2 times daily, Disp: , Rfl:     metoprolol tartrate (LOPRESSOR) 50 MG tablet, Take 50 mg by mouth 2 times daily, Disp: , Rfl:     Immunizations:    Influenza status:    []   Current   [x]   Patient declined    Pneumococcal status:  []   Current  [x]   Patient declined  Covid status:   [x]  Dose #1:                     [x]  Dose #2:               []   Patient declined    Smoking Status:    [x] Smoker - PPD:   [x] Nonsmoker - Quit Date:               [] Never a smoker      Cancer Screening:  Colonoscopy   [x] Current       [] Not current   [] Not current, but scheduled   [] NA  Mammogram   [] Current       [] Not current   [] Not current, but scheduled   [x] NA  Prostate           [x] Current       [] Not current   [] Not current, but scheduled   [] NA  PAP/Pelvic      [] Current       [] Not current   [] Not current, but scheduled   [x] NA  Skin                 [x] Current       [] Not current   [] Not current, but scheduled   [] NA     Hormone:  Lupron []   Last dose given:           Next dose due:   Eligard []   Last dose given:           Next dose due:   Aromatase Inhibitors []   Medication name:   N/A: [x]           FALLS RISK SCREEN  Instructions:  Assess the patient and enter the appropriate indicators that are present for fall risk identification. Total the numbers entered and assign a fall risk score from Table 2.  Reassess patient at a minimum every 12 weeks or with status change. Assessment   Date  10/27/2022     1. Mental Ability: confusion/cognitively impaired 0     2. Elimination Issues: incontinence, frequency 0       3. Ambulatory: use of assistive devices (walker, cane, off-loading devices),        attached to equipment (IV pole, oxygen) 0     4. Sensory Limitations: dizziness, vertigo, impaired vision 0     5. Age less than 65        0     6. Age 72 or greater 1     7. Medication: diuretics, strong analgesics, hypnotics, sedatives,        antihypertensive agents 3   8. Falls:  recent history of falls within the last 3 months (not to include slipping or        tripping) 0   TOTAL 4    If score of 4 or greater was education given? Yes           TABLE 2   Risk Score Risk Level Plan of Care   0-3 Little or  No Risk 1. Provide assistance as indicated for ambulation activities  2. Reorient confused/cognitively impaired patient  3. Chair/bed in low position, stretcher/bed with siderails up except when performing patient care activities  5. Educate patient/family/caregiver on falls prevention  6.  Reassess in 12 weeks or with any noted change in patient condition which places them at a risk for a fall   4-6 Moderate Risk 1. Provide assistance as indicated for ambulation activities  2. Reorient confused/cognitively impaired patient  3. Chair/bed in low position, stretcher/bed with siderails up except when performing patient care activities  4. Educate patient/family/caregiver on falls prevention     7 or   Higher High Risk 1. Place patient in easily observable treatment room  2. Patient attended at all times by family member or staff  3.   Provide assistance as indicated for ambulation activities  4. Reorient confused/cognitively impaired patient  5. Chair/bed in low position, stretcher/bed with siderails up except when performing patient care activities  6. Educate patient/family/caregiver on falls prevention         PLAN: Patient is seen today in follow up. Patient denies bothersome urinary symptoms. States his symptoms are back to baseline. Denies concerns with bowel movements and denies blood in urine or stool. Fatigue has improved. Dr. Dominguez Fontenot to review PSA and pt sees Dr. Farr tomorrow in follow up as well.           Justine Hameed RN

## 2022-10-28 ENCOUNTER — OFFICE VISIT (OUTPATIENT)
Dept: UROLOGY | Age: 72
End: 2022-10-28
Payer: MEDICARE

## 2022-10-28 VITALS
SYSTOLIC BLOOD PRESSURE: 151 MMHG | WEIGHT: 204 LBS | HEIGHT: 73 IN | HEART RATE: 64 BPM | DIASTOLIC BLOOD PRESSURE: 88 MMHG | BODY MASS INDEX: 27.04 KG/M2

## 2022-10-28 DIAGNOSIS — Z85.46 HISTORY OF PROSTATE CANCER: Primary | ICD-10-CM

## 2022-10-28 PROCEDURE — 1123F ACP DISCUSS/DSCN MKR DOCD: CPT | Performed by: UROLOGY

## 2022-10-28 PROCEDURE — 99212 OFFICE O/P EST SF 10 MIN: CPT | Performed by: UROLOGY

## 2022-10-28 ASSESSMENT — ENCOUNTER SYMPTOMS
DIARRHEA: 0
SHORTNESS OF BREATH: 0
RESPIRATORY NEGATIVE: 1
GASTROINTESTINAL NEGATIVE: 1
WHEEZING: 0
EYE PAIN: 0
NAUSEA: 0
CONSTIPATION: 0
BACK PAIN: 0
VOMITING: 0
EYE REDNESS: 0
EYES NEGATIVE: 1
ABDOMINAL PAIN: 0
COUGH: 0

## 2022-10-28 NOTE — PROGRESS NOTES
[address] [place] [address]  Dept: [address] [facility] Urology Office Note - Established    Patient:  Hattie Martinez  YOB: 1950  Date: 10/28/2022    The patient is a 80 y.o. male who presents todayfor evaluation of the following problems:   Chief Complaint   Patient presents with    Prostate Cancer     4 month with PSA       HPI  Here in follow up for prostae cancer. Finished XRT in May. Doing well. PSA is 1.47. He is voiding well. No meds. Summary of old records: N/A    Additional History: N/A    Procedures Today: N/A    Urinalysis today:  No results found for this visit on 10/28/22. Last several PSA's:  Lab Results   Component Value Date    PSA 1.47 10/21/2022    PSA 3.11 06/23/2022    PSA 5.28 (H) 12/10/2021     Last total testosterone:  No results found for: TESTOSTERONE    AUA Symptom Score (10/28/2022): Last BUN and creatinine:  Lab Results   Component Value Date    BUN 15 02/11/2019     Lab Results   Component Value Date    CREATININE 1.03 03/22/2022       Additional Lab/Culture results: none    Imaging Reviewed during this Office Visit: none  (results were independently reviewed by physician and radiology report verified)    PAST MEDICAL, FAMILY AND SOCIAL HISTORY UPDATE:  Past Medical History:   Diagnosis Date    A-fib ([address]     Alcohol abuse 2/9/2016    BMI 26.0-26.9,adult 8/6/2015    Diverticulosis     Hyperlipidemia LDL goal < 130 8/6/2015    Hypertension     Mixed hyperlipidemia 8/6/2015    replace inactive diagnosis    Overweight (BMI 25.0-29. 9) 8/6/2015    Sinusitis      Past Surgical History:   Procedure Laterality Date    COLONOSCOPY      10 YEARS AGO    HERNIA REPAIR      RIGHT INGUINAL     No family history on file.   Outpatient Medications Marked as Taking for the 10/28/22 encounter (Office Visit) with Jay Pitt MD Medication Sig Dispense Refill    escitalopram (LEXAPRO) 10 MG tablet TAKE 1 TABLET DAILY 90 tablet 3    atorvastatin (LIPITOR) 80 MG tablet Take 0.5 tablets by mouth nightly 90 tablet 3    Omega-3 Fatty Acids (FISH OIL) 1000 MG CAPS Take 4 capsules by mouth daily 120 capsule 11    fluticasone (FLONASE) 50 MCG/ACT nasal spray USE 1 SPRAY NASALLY DAILY 48 g 3    Multiple Vitamins-Minerals (CENTRUM MEN PO) Take by mouth daily      apixaban (ELIQUIS) 5 MG TABS tablet Take by mouth 2 times daily      metoprolol tartrate (LOPRESSOR) 50 MG tablet Take 50 mg by mouth 2 times daily         Sulfa antibiotics  Social History     Tobacco Use   Smoking Status Never   Smokeless Tobacco Never     (Ifpatient a smoker, smoking cessation counseling offered)    Social History     Substance and Sexual Activity   Alcohol Use Yes    Alcohol/week: 0.0 standard drinks    Comment: per pt he binge drinks on the weekends        REVIEW OF SYSTEMS:  Review of Systems    Physical Exam:      Vitals:    10/28/22 1044   BP: (!) 151/88   Pulse: 64     Body mass index is 26.91 kg/m². Patient is a 80 y.o. male in no acute distress and alert and oriented to person, place and time. Physical Exam  Constitutional: Patient in no acute distress. Neuro: Alert and oriented to person, place and time. Psych: Mood normal, affect normal  Lungs: Respiratory effort is normal  Cardiovascular: Warm & Pink  Abdomen: Soft, non-tender, non-distended with no CVA,  No flank tenderness,  Or hepatosplenomegaly   Lymphatics: No palpablelymphadenopathy. Bladder non-tender and not distended. Musculoskeletal: Normal gait and station    Assessment and Plan      1. History of prostate cancer           Plan:         Doing well. PSA continues to trend down. F/U in 6 months with a PSA. Return in about 6 months (around 4/28/2023). Prescriptions Ordered:  No orders of the defined types were placed in this encounter.     Orders Placed:  Orders Placed This Encounter Procedures    PSA, Diagnostic     Standing Status:   Future     Standing Expiration Date:   10/28/2023             Tricia Portillo MD    Agree with the ROS entered by the MA.

## 2022-10-28 NOTE — LETTER
[address] [place] [address]  Dept: [phone]  Dept Fax: [phone]        10/28/22    Patient: Hattie Martinez  YOB: 1950    Dear Radha Avila MD,    I had the pleasure of seeing one of your patients, Jose Luis Beard today in the office today. Below are the relevant portions of my assessment and plan of care. IMPRESSION:  1. History of prostate cancer        PLAN:  Doing well. PSA continues to trend down. F/U in 6 months with a PSA. Return in about 6 months (around 4/28/2023). Prescriptions Ordered:  No orders of the defined types were placed in this encounter. Orders Placed:  Orders Placed This Encounter   Procedures    PSA, Diagnostic     Standing Status:   Future     Standing Expiration Date:   10/28/2023          Thank you for allowing me to participate in the care of this patient. I will keep you updated on this patient's follow up and I look forward to serving you and your patients again in the future.         Jay Pitt MD

## 2022-10-31 NOTE — PROGRESS NOTES
[facility]            Radiation Oncology          [address]          [place], [address]        Neptali Jameson: [phone]        F: [phone]       [email or web address]         Date of Service: 10/27/2022     Location:  [facility] [address],   [address] [place], [place]   [phone]        RADIATION ONCOLOGY FOLLOW UP NOTE    Patient ID:   Faheem Shaw  : 1950   MRN: [id number]    DIAGNOSIS: low volume high risk prostate cancer sergio 4+4 (1 core of 3 positive), PSA 5.28, [id number]    -s/p hypofract RT 70Gy 22    INTERVAL HISTORY:   Faheem Shaw is a 80 y.o.. male who presents for follow up 5 months after treatment for his above stated prostate cancer. He denies any new dysuria, hematuria, urgency, or other problems with his bladder. He is not any problems with his bowels of diarrhea or constipation. He is not taking any medications for his bladder at this time, though notes he still drinks a lot of alcohol which causes him some trouble. He denies any other symptoms of headaches, chest pain, SOB, abdominal pain, N/V, swelling, bleeding, weight loss, or fatigue. His recent PSA is down to 1.47 on 2022.      MEDICATIONS:    Current Outpatient Medications:     escitalopram (LEXAPRO) 10 MG tablet, TAKE 1 TABLET DAILY, Disp: 90 tablet, Rfl: 3    atorvastatin (LIPITOR) 80 MG tablet, Take 0.5 tablets by mouth nightly, Disp: 90 tablet, Rfl: 3    Omega-3 Fatty Acids (FISH OIL) 1000 MG CAPS, Take 4 capsules by mouth daily, Disp: 120 capsule, Rfl: 11    fluticasone (FLONASE) 50 MCG/ACT nasal spray, USE 1 SPRAY NASALLY DAILY, Disp: 48 g, Rfl: 3    Multiple Vitamins-Minerals (CENTRUM MEN PO), Take by mouth daily, Disp: , Rfl:     apixaban (ELIQUIS) 5 MG TABS tablet, Take by mouth 2 times daily, Disp: , Rfl:     metoprolol tartrate (LOPRESSOR) 50 MG tablet, Take 50 mg by mouth 2 times daily, Disp: , Rfl:     ALLERGIES:  Allergies Allergen Reactions    Sulfa Antibiotics          REVIEW OF SYSTEMS:    A full 14 point review of systems was performed and assessed and found to be negative except as noted above. PHYSICAL EXAMINATION:    CHAPERONE: Not Required    ECO Asymptomatic    VITAL SIGNS: BP (!) 165/84   Pulse 93   Temp 97.2 °F (36.2 °C) (Temporal)   Resp 14   Wt 204 lb (92.5 kg)   BMI 26.91 kg/m²   GENERAL:  General appearance is that of a well-nourished, well-developed in no apparent distress. HEENT: Normocephalic, atraumatic, EOMI, moist mucosa, no erythema. Indirect exam .  NECK:  No adenopathy or a palpable thyroid mass, trachea is midline. LYMPHATICS: No cervical, supraclavicular, or axillary adenopathy. HEART:  Normal rate and regular rhythm, normal heart sounds. LUNGS:  Pulmonary effort normal. Normal breath sounds. ABDOMEN:  Soft, nontender, non distended, and no hepatosplenomegaly. EXTREMITIES:  No edema. No calf tenderness. MSK:  No spinal tenderness. Normal ROM. NEUROLOGICAL: Alert and oriented. Strength and sensation intact bilaterally. No focal deficits. PSYCH: Mood normal, behavior normal.  SKIN: No erythema, no desquamation. LABS:  WBC   Date Value Ref Range Status   02/10/2016 7.3 3.5 - 11.0 k/uL Final     Segs Absolute   Date Value Ref Range Status   02/10/2016 3.90 1.3 - 9.1 k/uL Final     Hemoglobin   Date Value Ref Range Status   02/10/2016 16.8 13.5 - 17.5 g/dL Final     Platelets   Date Value Ref Range Status   02/10/2016 209 150 - 450 k/uL Final     No results found for: , CEA  PSA   Date Value Ref Range Status   10/21/2022 1.47 <4.1 ng/mL Final     Comment:     The Roche \"ECLIA\" assay is used. Results obtained with different assay methods cannot be   used interchangeably. 2022 3.11 <4.1 ng/mL Final     Comment:     The Roche \"ECLIA\" assay is used. Results obtained with different assay methods cannot be   used interchangeably.      12/10/2021 5.28 (H) <4.1 ug/L Final     Comment:     The Roche \"ECLIA\" assay is used. Results obtained with different assay methods cannot be   used interchangeably. 06/11/2021 4.90 (H) <4.1 ug/L Final     Comment:     The Roche \"ECLIA\" assay is used. Results obtained with different assay methods cannot be   used interchangeably. PSA 3.11 6/23/22  PSA 5.28 12/10/21     IMAGING:   NA      ASSESSMENT AND PLAN:  Cleve Johnson is a 80 y.o. male with low volume high risk prostate cancer s/p definitive radiation therapy 70Gy/28fx completed 5/27/22 who presents for follow up 5 months after treatment. He has healed from treatment and denies any lingering toxicities from radiation. His PSA has responded nicely and continues to decline. We encourage him to continue working on cutting down his alcohol consumption as it may affect his bowel or bladder symptoms. We will continue with surveillance PSAs with urology and with us as well. Patient is recommended to come back in 6 months for another follow up visit and exam, or can call to come see us sooner if symptoms change. Patient was in agreement with my recommendations. All questions were answered to their satisfaction. Patient was advised to contact us anytime should they have any questions or concerns. Electronically signed by Rivka Macias MD on 10/31/2022 at 1:42 PM        Medications Prescribed:   New Prescriptions    No medications on file       Orders:   Orders Placed This Encounter   Procedures    PSA, Diagnostic       CC:  Patient Care Team:  Magdalena Perkins MD as PCP - General (Family Medicine)  Magdalena Perkins MD as PCP - [facility]     Total time spent on this case on the day of encounter is 30 minutes.  This time includes combination of one or more of the following - review of necessary tests, review of pertinent medical records from the EMR, performing medically appropriate examination and evaluation, counseling and educating the patient/family/caregiver, ordering necessary medical tests, procedures etc., documenting the clinical information in the electronic medical record, care coordination, referring and communicating with other health care providers and interpretation of results independently. This note is created with the assistance of a speech recognition program.  While intending to generate a document that actually reflects the content of the visit, the document can still have some errors including those of syntax and sound a like substitutions which may escape proof reading. It such instances, actual meaning can be extrapolated by contextual diversion.

## 2023-02-23 PROBLEM — C61 PROSTATE CANCER (HCC): Status: ACTIVE | Noted: 2023-02-23

## 2023-04-26 ENCOUNTER — HOSPITAL ENCOUNTER (OUTPATIENT)
Age: 73
Discharge: HOME | End: 2023-04-26
Payer: MEDICARE

## 2023-04-26 DIAGNOSIS — Z85.46 HISTORY OF PROSTATE CANCER: ICD-10-CM

## 2023-04-26 LAB — PROSTATE SPECIFIC ANTIGEN: 1.1 NG/ML

## 2023-04-26 PROCEDURE — 84153 ASSAY OF PSA TOTAL: CPT

## 2023-04-26 PROCEDURE — 36415 COLL VENOUS BLD VENIPUNCTURE: CPT

## 2023-05-02 ENCOUNTER — HOSPITAL ENCOUNTER (OUTPATIENT)
Dept: RADIATION ONCOLOGY | Age: 73
Discharge: HOME | End: 2023-05-02
Payer: MEDICARE

## 2023-05-02 VITALS
BODY MASS INDEX: 27.31 KG/M2 | TEMPERATURE: 97.4 F | WEIGHT: 207 LBS | RESPIRATION RATE: 14 BRPM | SYSTOLIC BLOOD PRESSURE: 165 MMHG | DIASTOLIC BLOOD PRESSURE: 83 MMHG | HEART RATE: 88 BPM

## 2023-05-02 DIAGNOSIS — C61 PROSTATE CANCER (HCC): Primary | ICD-10-CM

## 2023-05-02 PROCEDURE — 99212 OFFICE O/P EST SF 10 MIN: CPT | Performed by: RADIOLOGY

## 2023-05-02 ASSESSMENT — PAIN SCALES - GENERAL: PAINLEVEL_OUTOF10: 0

## 2023-05-03 NOTE — PROGRESS NOTES
[facility]            Radiation Oncology          [address] [facility]          [place], [address]        Alver Chol: [phone]        F: [phone]       [email or web address]         Date of Service: 2023     Location:  [facility],   [address] [facility] [place]   [phone]        RADIATION ONCOLOGY FOLLOW UP NOTE    Patient ID:   Meño Mcdonnell  : 1950   MRN: [id number]    DIAGNOSIS: low volume high risk prostate cancer sergio 4+4 (1 core of 3 positive), PSA 5.28, [id number]    -s/p hypofract RT 70Gy 22    INTERVAL HISTORY:   Meño Mcdonnell is a 71 y.o.. male who presents for follow up 1 year after treatment for his above stated prostate cancer. He denies any new dysuria, hematuria, urgency, or other problems with his bladder. He is not any problems with his bowels of diarrhea or constipation. He is not taking any medications for his bladder at this time, though notes stop drinking alcohol which has helped with his frequency. He denies any other symptoms of headaches, chest pain, SOB, abdominal pain, N/V, swelling, bleeding, weight loss, or bony pain. He does report fatigue overall. His recent PSA is down to 1.1 on 2023. MEDICATIONS:    Current Outpatient Medications:      Boswellia-Glucosamine-Vit D (OSTEO BI-FLEX ONE PER DAY PO), Take by mouth, Disp: , Rfl:     escitalopram (LEXAPRO) 10 MG tablet, Take 1 tablet by mouth daily, Disp: 90 tablet, Rfl: 3    atorvastatin (LIPITOR) 80 MG tablet, TAKE ONE-HALF (1/2) TABLET NIGHTLY, Disp: 90 tablet, Rfl: 3    Omega-3 Fatty Acids (FISH OIL) 1000 MG CAPS, Take 4 capsules by mouth daily, Disp: 120 capsule, Rfl: 11    fluticasone (FLONASE) 50 MCG/ACT nasal spray, USE 1 SPRAY NASALLY DAILY, Disp: 48 g, Rfl: 3    Multiple Vitamins-Minerals (CENTRUM MEN PO), Take by mouth daily, Disp: , Rfl:     apixaban (ELIQUIS) 5 MG TABS tablet, Take by mouth 2 times daily, Disp: ,
name:   N/A:  [x]           FALLS RISK SCREEN  Instructions:  Assess the patient and enter the appropriate indicators that are present for fall risk identification. Total the numbers entered and assign a fall risk score from Table 2.  Reassess patient at a minimum every 12 weeks or with status change. Assessment   Date  5/2/2023     1. Mental Ability: confusion/cognitively impaired 0     2. Elimination Issues: incontinence, frequency 0       3. Ambulatory: use of assistive devices (walker, cane, off-loading devices),        attached to equipment (IV pole, oxygen) 0     4. Sensory Limitations: dizziness, vertigo, impaired vision 0     5. Age less than 65        0     6. Age 72 or greater 1     7. Medication: diuretics, strong analgesics, hypnotics, sedatives,        antihypertensive agents 0   8. Falls:  recent history of falls within the last 3 months (not to include slipping or        tripping) 0   TOTAL 1    If score of 4 or greater was education given? No           TABLE 2   Risk Score Risk Level Plan of Care   0-3 Little or  No Risk 1. Provide assistance as indicated for ambulation activities  2. Reorient confused/cognitively impaired patient  3. Chair/bed in low position, stretcher/bed with siderails up except when performing patient care activities  5. Educate patient/family/caregiver on falls prevention  6.  Reassess in 12 weeks or with any noted change in patient condition which places them at a risk for a fall   4-6 Moderate Risk 1. Provide assistance as indicated for ambulation activities  2. Reorient confused/cognitively impaired patient  3. Chair/bed in low position, stretcher/bed with siderails up except when performing patient care activities  4. Educate patient/family/caregiver on falls prevention     7 or   Higher High Risk 1. Place patient in easily observable treatment room  2. Patient attended at all times by family member or staff  3.   Provide assistance as indicated for

## 2023-05-05 ENCOUNTER — OFFICE VISIT (OUTPATIENT)
Dept: UROLOGY | Age: 73
End: 2023-05-05
Payer: MEDICARE

## 2023-05-05 VITALS
DIASTOLIC BLOOD PRESSURE: 79 MMHG | HEIGHT: 73 IN | TEMPERATURE: 98.6 F | SYSTOLIC BLOOD PRESSURE: 133 MMHG | HEART RATE: 87 BPM | BODY MASS INDEX: 27.17 KG/M2 | WEIGHT: 205 LBS

## 2023-05-05 DIAGNOSIS — Z85.46 HISTORY OF PROSTATE CANCER: Primary | ICD-10-CM

## 2023-05-05 PROCEDURE — G8427 DOCREV CUR MEDS BY ELIG CLIN: HCPCS | Performed by: UROLOGY

## 2023-05-05 PROCEDURE — 99212 OFFICE O/P EST SF 10 MIN: CPT | Performed by: UROLOGY

## 2023-05-05 PROCEDURE — 1123F ACP DISCUSS/DSCN MKR DOCD: CPT | Performed by: UROLOGY

## 2023-05-05 PROCEDURE — G8417 CALC BMI ABV UP PARAM F/U: HCPCS | Performed by: UROLOGY

## 2023-05-05 PROCEDURE — 1036F TOBACCO NON-USER: CPT | Performed by: UROLOGY

## 2023-05-05 PROCEDURE — 3017F COLORECTAL CA SCREEN DOC REV: CPT | Performed by: UROLOGY

## 2023-05-05 ASSESSMENT — ENCOUNTER SYMPTOMS
VOMITING: 0
CONSTIPATION: 0
SHORTNESS OF BREATH: 0
BACK PAIN: 0
COUGH: 0
NAUSEA: 0
DIARRHEA: 0
WHEEZING: 0
EYE REDNESS: 0
EYE PAIN: 0
ABDOMINAL PAIN: 0

## 2023-05-05 NOTE — PROGRESS NOTES
[address] [address]  Dept: [address] [facility] Urology Office Note - Established    Patient:  Kari Pablo  YOB: 1950  Date: 5/5/2023    The patient is a 71 y.o. male who presents todayfor evaluation of the following problems:   Chief Complaint   Patient presents with    Follow-up     6 month with PSA        HPI  He is here in follow up after XRT  PSA is down to 1.10. No voiding issues. He had discussed ADT, but did not have it. Summary of old records: N/A    Additional History: N/A    Procedures Today: N/A    Urinalysis today:  No results found for this visit on 05/05/23. Last several PSA's:  Lab Results   Component Value Date    PSA 1.10 04/26/2023    PSA 1.47 10/21/2022    PSA 3.11 06/23/2022     Last total testosterone:  No results found for: TESTOSTERONE    AUA Symptom Score (5/5/2023): Last BUN and creatinine:  Lab Results   Component Value Date    BUN 15 02/11/2019     Lab Results   Component Value Date    CREATININE 1.03 03/22/2022       Additional Lab/Culture results: none    Imaging Reviewed during this Office Visit: none  (results were independently reviewed by physician and radiology report verified)    PAST MEDICAL, FAMILY AND SOCIAL HISTORY UPDATE:  Past Medical History:   Diagnosis Date    A-fib ([address]     Alcohol abuse 2/9/2016    BMI 26.0-26.9,adult 8/6/2015    Diverticulosis     Hyperlipidemia LDL goal < 130 8/6/2015    Hypertension     Mixed hyperlipidemia 8/6/2015    replace inactive diagnosis    Overweight (BMI 25.0-29. 9) 8/6/2015    Sinusitis      Past Surgical History:   Procedure Laterality Date    COLONOSCOPY      10 YEARS AGO    HERNIA REPAIR      RIGHT INGUINAL     History reviewed. No pertinent family history.   Outpatient Medications Marked as Taking for the 5/5/23 encounter (Office Visit) with Kelly Orellana

## 2023-10-31 ENCOUNTER — HOSPITAL ENCOUNTER (OUTPATIENT)
Age: 73
Discharge: HOME | End: 2023-10-31
Payer: MEDICARE

## 2023-10-31 DIAGNOSIS — Z85.46 HISTORY OF PROSTATE CANCER: ICD-10-CM

## 2023-10-31 DIAGNOSIS — I48.20 CHRONIC ATRIAL FIBRILLATION (HCC): ICD-10-CM

## 2023-10-31 DIAGNOSIS — R53.83 FATIGUE, UNSPECIFIED TYPE: Primary | ICD-10-CM

## 2023-10-31 DIAGNOSIS — R53.83 FATIGUE, UNSPECIFIED TYPE: ICD-10-CM

## 2023-10-31 DIAGNOSIS — E78.2 MIXED HYPERLIPIDEMIA: ICD-10-CM

## 2023-10-31 LAB
ALBUMIN SERPL-MCNC: 4.3 G/DL (ref 3.5–5.2)
ALP SERPL-CCNC: 79 U/L (ref 40–129)
ALT SERPL-CCNC: 31 U/L (ref 5–41)
ANION GAP SERPL CALCULATED.3IONS-SCNC: 10 MMOL/L (ref 9–17)
AST SERPL-CCNC: 26 U/L
BILIRUB SERPL-MCNC: 1.3 MG/DL (ref 0.3–1.2)
BUN SERPL-MCNC: 13 MG/DL (ref 8–23)
CALCIUM SERPL-MCNC: 9.2 MG/DL (ref 8.6–10.4)
CHLORIDE SERPL-SCNC: 104 MMOL/L (ref 98–107)
CHOLEST SERPL-MCNC: 198 MG/DL
CHOLESTEROL/HDL RATIO: 4.8
CO2 SERPL-SCNC: 27 MMOL/L (ref 20–31)
CREAT SERPL-MCNC: 1 MG/DL (ref 0.7–1.2)
GFR SERPL CREATININE-BSD FRML MDRD: >60 ML/MIN/1.73M2
GLUCOSE SERPL-MCNC: 115 MG/DL (ref 70–99)
HDLC SERPL-MCNC: 41 MG/DL
LDLC SERPL CALC-MCNC: 112 MG/DL (ref 0–130)
POTASSIUM SERPL-SCNC: 4.5 MMOL/L (ref 3.7–5.3)
PROT SERPL-MCNC: 7 G/DL (ref 6.4–8.3)
PSA SERPL-MCNC: 0.4 NG/ML
SODIUM SERPL-SCNC: 141 MMOL/L (ref 135–144)
TRIGL SERPL-MCNC: 223 MG/DL
TSH SERPL DL<=0.05 MIU/L-ACNC: 2.18 UIU/ML (ref 0.3–5)

## 2023-10-31 PROCEDURE — 84443 ASSAY THYROID STIM HORMONE: CPT

## 2023-10-31 PROCEDURE — 80061 LIPID PANEL: CPT

## 2023-10-31 PROCEDURE — 80053 COMPREHEN METABOLIC PANEL: CPT

## 2023-10-31 PROCEDURE — 84153 ASSAY OF PSA TOTAL: CPT

## 2023-10-31 PROCEDURE — 36415 COLL VENOUS BLD VENIPUNCTURE: CPT

## 2023-11-07 ENCOUNTER — OFFICE VISIT (OUTPATIENT)
Dept: UROLOGY | Age: 73
End: 2023-11-07
Payer: MEDICARE

## 2023-11-07 VITALS
DIASTOLIC BLOOD PRESSURE: 79 MMHG | OXYGEN SATURATION: 97 % | BODY MASS INDEX: 27.43 KG/M2 | TEMPERATURE: 97.2 F | SYSTOLIC BLOOD PRESSURE: 138 MMHG | HEIGHT: 73 IN | WEIGHT: 207 LBS | HEART RATE: 63 BPM

## 2023-11-07 DIAGNOSIS — Z85.46 HISTORY OF PROSTATE CANCER: Primary | ICD-10-CM

## 2023-11-07 PROCEDURE — 99212 OFFICE O/P EST SF 10 MIN: CPT | Performed by: UROLOGY

## 2023-11-07 PROCEDURE — 3017F COLORECTAL CA SCREEN DOC REV: CPT | Performed by: UROLOGY

## 2023-11-07 PROCEDURE — G8484 FLU IMMUNIZE NO ADMIN: HCPCS | Performed by: UROLOGY

## 2023-11-07 PROCEDURE — 1036F TOBACCO NON-USER: CPT | Performed by: UROLOGY

## 2023-11-07 PROCEDURE — G8427 DOCREV CUR MEDS BY ELIG CLIN: HCPCS | Performed by: UROLOGY

## 2023-11-07 PROCEDURE — G8417 CALC BMI ABV UP PARAM F/U: HCPCS | Performed by: UROLOGY

## 2023-11-07 PROCEDURE — 1123F ACP DISCUSS/DSCN MKR DOCD: CPT | Performed by: UROLOGY

## 2023-11-07 ASSESSMENT — ENCOUNTER SYMPTOMS
EYE REDNESS: 0
ABDOMINAL PAIN: 0
WHEEZING: 0
EYE PAIN: 0
COUGH: 0
BACK PAIN: 0
CONSTIPATION: 0
SHORTNESS OF BREATH: 0
NAUSEA: 0
VOMITING: 0
DIARRHEA: 0

## 2023-11-07 NOTE — PROGRESS NOTES
Review of Systems   Constitutional:  Negative for chills, fatigue and fever. Eyes:  Negative for pain, redness and visual disturbance. Respiratory:  Negative for cough, shortness of breath and wheezing. Cardiovascular:  Negative for chest pain and leg swelling. Gastrointestinal:  Negative for abdominal pain, constipation, diarrhea, nausea and vomiting. Genitourinary:  Negative for difficulty urinating, dysuria, flank pain, frequency, hematuria, scrotal swelling, testicular pain and urgency. Musculoskeletal:  Negative for back pain, joint swelling and myalgias. Skin:  Negative for rash and wound. Neurological:  Negative for dizziness, tremors, weakness and numbness. Hematological:  Does not bruise/bleed easily.
Future     Standing Expiration Date:   11/6/2024    PSA, Diagnostic     Standing Status:   Future     Standing Expiration Date:   11/6/2024           Rivka Santiago MD    Agree with the ROS entered by the MA.

## 2024-02-26 PROBLEM — Z85.46 HISTORY OF PROSTATE CANCER: Status: ACTIVE | Noted: 2024-02-26

## 2024-04-26 ENCOUNTER — HOSPITAL ENCOUNTER (OUTPATIENT)
Age: 74
Discharge: HOME | End: 2024-04-26
Payer: MEDICARE

## 2024-04-26 LAB — PSA SERPL-MCNC: 0.3 NG/ML (ref 0–4)

## 2024-04-26 PROCEDURE — 84153 ASSAY OF PSA TOTAL: CPT

## 2024-04-26 PROCEDURE — 36415 COLL VENOUS BLD VENIPUNCTURE: CPT

## 2024-05-07 ENCOUNTER — HOSPITAL ENCOUNTER (OUTPATIENT)
Dept: RADIATION ONCOLOGY | Age: 74
Discharge: HOME | End: 2024-05-07
Payer: MEDICARE

## 2024-05-07 VITALS
WEIGHT: 204 LBS | DIASTOLIC BLOOD PRESSURE: 83 MMHG | TEMPERATURE: 97.3 F | SYSTOLIC BLOOD PRESSURE: 128 MMHG | BODY MASS INDEX: 26.91 KG/M2 | HEART RATE: 83 BPM | RESPIRATION RATE: 16 BRPM | OXYGEN SATURATION: 99 %

## 2024-05-07 DIAGNOSIS — C61 PROSTATE CANCER (HCC): Primary | ICD-10-CM

## 2024-05-07 PROCEDURE — 99212 OFFICE O/P EST SF 10 MIN: CPT | Performed by: RADIOLOGY

## 2024-05-07 NOTE — PROGRESS NOTES
Chilo ANSELMO Bailey Sis  5/7/2024  2:38 PM      Vitals:    05/07/24 1415   BP: 128/83   Pulse: 83   Resp: 16   Temp: 97.3 °F (36.3 °C)   SpO2: 99%    :     Pain Assessment: None - Denies Pain          Wt Readings from Last 1 Encounters:   05/07/24 92.5 kg (204 lb)                Current Outpatient Medications:     escitalopram (LEXAPRO) 10 MG tablet, TAKE 1 TABLET BY MOUTH DAILY, Disp: 90 tablet, Rfl: 3    spironolactone (ALDACTONE) 25 MG tablet, Take 1 tablet by mouth daily (Patient not taking: Reported on 5/7/2024), Disp: , Rfl:     atorvastatin (LIPITOR) 80 MG tablet, TAKE 1/2 TABLET BY MOUTH NIGHTLY, Disp: 45 tablet, Rfl: 3    azelastine (ASTELIN) 0.1 % nasal spray, 1 spray by Nasal route 2 times daily Use in each nostril as directed, Disp: 60 mL, Rfl: 1    Boswellia-Glucosamine-Vit D (OSTEO BI-FLEX ONE PER DAY PO), Take by mouth, Disp: , Rfl:     Omega-3 Fatty Acids (FISH OIL) 1000 MG CAPS, Take 4 capsules by mouth daily, Disp: 120 capsule, Rfl: 11    Multiple Vitamins-Minerals (CENTRUM MEN PO), Take by mouth daily, Disp: , Rfl:     apixaban (ELIQUIS) 5 MG TABS tablet, Take by mouth 2 times daily, Disp: , Rfl:     metoprolol tartrate (LOPRESSOR) 50 MG tablet, Take 1 tablet by mouth 2 times daily, Disp: , Rfl:       Smoking Status:    [] Smoker - PPD:   [] Nonsmoker - Quit Date:               [x] Never a smoker      Cancer Screening:  Colonoscopy   [x] Current       [] Not current   [] Not current, but scheduled   [] NA  Mammogram   [] Current       [] Not current   [] Not current, but scheduled   [x] NA  Prostate           [x] Current       [] Not current   [] Not current, but scheduled   [] NA  PAP/Pelvic      [] Current       [] Not current   [] Not current, but scheduled   [x] NA  Skin                 [] Current       [] Not current   [] Not current, but scheduled   [x] NA     Hormone:  Lupron []   Last dose given:           Next dose due:   Eligard []   Last dose given:           Next dose due:   Anti-Estrogen

## 2024-05-09 NOTE — PROGRESS NOTES
[facility]            Radiation Oncology          [address]          [place] [address]        O: [phone]        F: [phone]       [email or web address]         Date of Service: 2024     Location:  [facility] Radiation Oncology,   [address] [place] [id number] [phone]        RADIATION ONCOLOGY FOLLOW UP NOTE    Patient ID:   Chilo Sands  : 1950   MRN: [id number]    DIAGNOSIS: low volume high risk prostate cancer sergio 4+4 (1 core of 3 positive), PSA 5.28, [id number]    -s/p hypofract RT 70Gy 22    INTERVAL HISTORY:   Chilo Sands is a 73 y.o.. male who presents for follow up 2 years after treatment for his above stated prostate cancer. He denies any new dysuria, hematuria, urgency, or other problems with his bladder.  He does note he has been waking up often at night and has feeling a little imbalance.  He does note however he has been on new cardiac medications and is following with cardiology for symptoms. He denies any other symptoms of headaches, chest pain, SOB, abdominal pain, N/V, swelling, bleeding, weight loss, or bony pain.  His recent PSA is down to 0.3 on 2024.    MEDICATIONS:    Current Outpatient Medications:     escitalopram (LEXAPRO) 10 MG tablet, TAKE 1 TABLET BY MOUTH DAILY, Disp: 90 tablet, Rfl: 3    spironolactone (ALDACTONE) 25 MG tablet, Take 1 tablet by mouth daily (Patient not taking: Reported on 2024), Disp: , Rfl:     atorvastatin (LIPITOR) 80 MG tablet, TAKE 1/2 TABLET BY MOUTH NIGHTLY, Disp: 45 tablet, Rfl: 3    azelastine (ASTELIN) 0.1 % nasal spray, 1 spray by Nasal route 2 times daily Use in each nostril as directed, Disp: 60 mL, Rfl: 1    Boswellia-Glucosamine-Vit D (OSTEO BI-FLEX ONE PER DAY PO), Take by mouth, Disp: , Rfl:     Omega-3 Fatty Acids (FISH OIL) 1000 MG CAPS, Take 4 capsules by mouth daily, Disp: 120 capsule, Rfl: 11    Multiple Vitamins-Minerals (CENTRUM

## 2024-10-29 ENCOUNTER — HOSPITAL ENCOUNTER (OUTPATIENT)
Age: 74
Discharge: HOME | End: 2024-10-29
Payer: MEDICARE

## 2024-10-29 DIAGNOSIS — Z85.46 HISTORY OF PROSTATE CANCER: ICD-10-CM

## 2024-10-29 LAB — PSA SERPL-MCNC: 0.4 NG/ML (ref 0–4)

## 2024-10-29 PROCEDURE — 84153 ASSAY OF PSA TOTAL: CPT

## 2024-10-29 PROCEDURE — 36415 COLL VENOUS BLD VENIPUNCTURE: CPT

## 2024-11-12 ENCOUNTER — OFFICE VISIT (OUTPATIENT)
Dept: UROLOGY | Age: 74
End: 2024-11-12
Payer: MEDICARE

## 2024-11-12 VITALS
TEMPERATURE: 97.8 F | HEART RATE: 73 BPM | BODY MASS INDEX: 27.05 KG/M2 | WEIGHT: 205 LBS | SYSTOLIC BLOOD PRESSURE: 131 MMHG | DIASTOLIC BLOOD PRESSURE: 88 MMHG

## 2024-11-12 DIAGNOSIS — Z85.46 HISTORY OF PROSTATE CANCER: Primary | ICD-10-CM

## 2024-11-12 PROCEDURE — G8484 FLU IMMUNIZE NO ADMIN: HCPCS | Performed by: UROLOGY

## 2024-11-12 PROCEDURE — G8427 DOCREV CUR MEDS BY ELIG CLIN: HCPCS | Performed by: UROLOGY

## 2024-11-12 PROCEDURE — 1123F ACP DISCUSS/DSCN MKR DOCD: CPT | Performed by: UROLOGY

## 2024-11-12 PROCEDURE — G8417 CALC BMI ABV UP PARAM F/U: HCPCS | Performed by: UROLOGY

## 2024-11-12 PROCEDURE — 3017F COLORECTAL CA SCREEN DOC REV: CPT | Performed by: UROLOGY

## 2024-11-12 PROCEDURE — 1159F MED LIST DOCD IN RCRD: CPT | Performed by: UROLOGY

## 2024-11-12 PROCEDURE — 99212 OFFICE O/P EST SF 10 MIN: CPT | Performed by: UROLOGY

## 2024-11-12 PROCEDURE — 1036F TOBACCO NON-USER: CPT | Performed by: UROLOGY

## 2024-11-12 ASSESSMENT — ENCOUNTER SYMPTOMS
CONSTIPATION: 0
EYE PAIN: 0
BACK PAIN: 0
COUGH: 0
CHOKING: 0
VOMITING: 0
DIARRHEA: 0
NAUSEA: 0
WHEEZING: 0
SHORTNESS OF BREATH: 0
EYE REDNESS: 0
ABDOMINAL PAIN: 0

## 2024-11-12 NOTE — PROGRESS NOTES
Review of Systems   Constitutional:  Negative for chills, fatigue and fever.   Eyes:  Negative for pain, redness and visual disturbance (glasses).   Respiratory:  Negative for cough, choking, shortness of breath and wheezing.    Cardiovascular:  Negative for chest pain, palpitations and leg swelling.   Gastrointestinal:  Negative for abdominal pain, constipation, diarrhea, nausea and vomiting.   Genitourinary:  Negative for decreased urine volume, difficulty urinating, dysuria, flank pain, frequency, hematuria, scrotal swelling, testicular pain and urgency.   Musculoskeletal:  Negative for arthralgias, back pain, gait problem, joint swelling and myalgias.   Skin:  Negative for rash and wound.   Neurological:  Negative for dizziness, tremors, weakness and numbness.   Hematological:  Does not bruise/bleed easily.     
  Medication Sig Dispense Refill    escitalopram (LEXAPRO) 10 MG tablet TAKE 1 TABLET BY MOUTH DAILY 90 tablet 3    atorvastatin (LIPITOR) 80 MG tablet TAKE 1/2 TABLET BY MOUTH NIGHTLY 45 tablet 3    azelastine (ASTELIN) 0.1 % nasal spray 1 spray by Nasal route 2 times daily Use in each nostril as directed 60 mL 1    Boswellia-Glucosamine-Vit D (OSTEO BI-FLEX ONE PER DAY PO) Take by mouth      Omega-3 Fatty Acids (FISH OIL) 1000 MG CAPS Take 4 capsules by mouth daily 120 capsule 11    Multiple Vitamins-Minerals (CENTRUM MEN PO) Take by mouth daily      apixaban (ELIQUIS) 5 MG TABS tablet Take by mouth 2 times daily      metoprolol tartrate (LOPRESSOR) 50 MG tablet Take 1 tablet by mouth 2 times daily         Sulfa antibiotics and Zetia [ezetimibe]  Social History     Tobacco Use   Smoking Status Never   Smokeless Tobacco Never     (Ifpatient a smoker, smoking cessation counseling offered)    Social History     Substance and Sexual Activity   Alcohol Use Yes    Alcohol/week: 12.0 standard drinks of alcohol    Types: 6 Cans of beer, 6 Drinks containing 0.5 oz of alcohol per week    Comment: varies /watching very carefully       REVIEW OF SYSTEMS:  Review of Systems    Physical Exam:      Vitals:    11/12/24 1258   BP: 131/88   Pulse: 73   Temp: 97.8 °F (36.6 °C)     Body mass index is 27.05 kg/m².  Patient is a 73 y.o. male in no acute distress and alert and oriented to person, place and time.  Physical Exam  Constitutional: Patient in no acute distress.  Neuro: Alert and oriented to person, place and time.  Psych: Mood normal, affect normal    Assessment and Plan      1. History of prostate cancer           Plan:    Assessment & Plan     Doing well.   PSA remains 0.40, which is good post XRT.  F/U in 1 year with a PSA.     Return in about 1 year (around 11/12/2025) for Labs.    Prescriptions Ordered:  No orders of the defined types were placed in this encounter.    Orders Placed:  Orders Placed This Encounter

## 2025-03-03 ENCOUNTER — HOSPITAL ENCOUNTER (OUTPATIENT)
Age: 75
Setting detail: SPECIMEN
Discharge: HOME | End: 2025-03-03

## 2025-03-03 DIAGNOSIS — E78.2 MIXED HYPERLIPIDEMIA: ICD-10-CM

## 2025-03-03 LAB
ALBUMIN SERPL-MCNC: 4.6 G/DL (ref 3.5–5.2)
ALBUMIN/GLOB SERPL: 1.8 {RATIO} (ref 1–2.5)
ALP SERPL-CCNC: 76 U/L (ref 40–129)
ALT SERPL-CCNC: 52 U/L (ref 10–50)
ANION GAP SERPL CALCULATED.3IONS-SCNC: 11 MMOL/L (ref 9–16)
AST SERPL-CCNC: 35 U/L (ref 10–50)
BILIRUB SERPL-MCNC: 1.2 MG/DL (ref 0–1.2)
BUN SERPL-MCNC: 12 MG/DL (ref 8–23)
CALCIUM SERPL-MCNC: 9.4 MG/DL (ref 8.6–10.4)
CHLORIDE SERPL-SCNC: 102 MMOL/L (ref 98–107)
CHOLEST SERPL-MCNC: 176 MG/DL (ref 0–199)
CHOLESTEROL/HDL RATIO: 4.8
CO2 SERPL-SCNC: 29 MMOL/L (ref 20–31)
CREAT SERPL-MCNC: 1 MG/DL (ref 0.7–1.2)
GFR, ESTIMATED: 79 ML/MIN/1.73M2
GLUCOSE SERPL-MCNC: 106 MG/DL (ref 74–99)
HDLC SERPL-MCNC: 37 MG/DL
LDLC SERPL CALC-MCNC: 91 MG/DL (ref 0–100)
POTASSIUM SERPL-SCNC: 4 MMOL/L (ref 3.7–5.3)
PROT SERPL-MCNC: 7.2 G/DL (ref 6.6–8.7)
SODIUM SERPL-SCNC: 142 MMOL/L (ref 136–145)
TRIGL SERPL-MCNC: 242 MG/DL
VLDLC SERPL CALC-MCNC: 48 MG/DL (ref 1–30)

## 2025-03-04 NOTE — RESULT ENCOUNTER NOTE
Call - your sugar is slightly elevated. We are going to call the lab to run a hemoglobin A1c on the blood you already had drawn; this test will tell us how your glucose has been over the past 3 months, 24 hours a day, 7 days a week. If this is elevated, we will discuss treatment for prediabetes or diabetes.     Yvette - A1c ordered

## 2025-03-05 ENCOUNTER — HOSPITAL ENCOUNTER (OUTPATIENT)
Age: 75
Setting detail: SPECIMEN
Discharge: HOME | End: 2025-03-05

## 2025-03-05 DIAGNOSIS — R73.9 HYPERGLYCEMIA: ICD-10-CM

## 2025-03-05 LAB
EST. AVERAGE GLUCOSE BLD GHB EST-MCNC: 103 MG/DL
HBA1C MFR BLD: 5.2 % (ref 4–6)

## 2025-04-30 ENCOUNTER — HOSPITAL ENCOUNTER (OUTPATIENT)
Age: 75
Discharge: HOME | End: 2025-04-30
Payer: MEDICARE

## 2025-04-30 DIAGNOSIS — C61 PROSTATE CANCER (HCC): ICD-10-CM

## 2025-04-30 LAB — PSA SERPL-MCNC: 0.22 NG/ML (ref 0–4)

## 2025-04-30 PROCEDURE — 36415 COLL VENOUS BLD VENIPUNCTURE: CPT

## 2025-04-30 PROCEDURE — 84153 ASSAY OF PSA TOTAL: CPT

## 2025-05-13 ENCOUNTER — HOSPITAL ENCOUNTER (OUTPATIENT)
Dept: RADIATION ONCOLOGY | Age: 75
Discharge: HOME | End: 2025-05-13
Payer: MEDICARE

## 2025-05-13 VITALS
DIASTOLIC BLOOD PRESSURE: 95 MMHG | WEIGHT: 206.2 LBS | RESPIRATION RATE: 16 BRPM | OXYGEN SATURATION: 99 % | TEMPERATURE: 98 F | HEART RATE: 90 BPM | SYSTOLIC BLOOD PRESSURE: 153 MMHG | BODY MASS INDEX: 27.2 KG/M2

## 2025-05-13 PROCEDURE — 99212 OFFICE O/P EST SF 10 MIN: CPT | Performed by: RADIOLOGY

## 2025-05-14 NOTE — PROGRESS NOTES
Rfl:     metoprolol tartrate (LOPRESSOR) 50 MG tablet, Take 1 tablet by mouth 2 times daily, Disp: , Rfl:     ALLERGIES:  Allergies   Allergen Reactions    Sulfa Antibiotics     Zetia [Ezetimibe]      Myalgia           REVIEW OF SYSTEMS:    A full 14 point review of systems was performed and assessed and found to be negative except as noted above.      PHYSICAL EXAMINATION:    CHAPERONE: Not Required    ECO Asymptomatic    VITAL SIGNS: BP (!) 153/95   Pulse 90   Temp 98 °F (36.7 °C) (Temporal)   Resp 16   Wt 93.5 kg (206 lb 3.2 oz)   SpO2 99%   BMI 27.20 kg/m²   GENERAL:  General appearance is that of a well-nourished, well-developed in no apparent distress.  HEENT: Normocephalic, atraumatic, EOMI, moist mucosa, no erythema.   Indirect exam .  NECK:  No adenopathy or a palpable thyroid mass, trachea is midline.  LYMPHATICS: No cervical, supraclavicular adenopathy.  HEART:  Normal rate and regular rhythm, normal heart sounds.   LUNGS:  Pulmonary effort normal. Normal breath sounds.   ABDOMEN:  Soft, nontender, non distended.  EXTREMITIES:  No edema.  No calf tenderness.  MSK:  No spinal tenderness. Normal ROM.  NEUROLOGICAL: Alert and oriented. Strength and sensation intact bilaterally. No focal deficits.   PSYCH: Mood normal, behavior normal.  SKIN: No erythema, no desquamation.  RECTAL: Deferred.    LABS:  WBC   Date Value Ref Range Status   02/10/2016 7.3 3.5 - 11.0 k/uL Final     Neutrophils Absolute   Date Value Ref Range Status   02/10/2016 3.90 1.3 - 9.1 k/uL Final     Hemoglobin   Date Value Ref Range Status   02/10/2016 16.8 13.5 - 17.5 g/dL Final     Platelets   Date Value Ref Range Status   02/10/2016 209 150 - 450 k/uL Final     No results found for: \"\", \"CEA\"  PSA   Date Value Ref Range Status   2025 0.22 0.00 - 4.00 ng/mL Final     Comment:     The Roche \"ECLIA\" assay is used.  Results obtained with different assay methods cannot be   used interchangeably.     10/29/2024 0.40 0.00 
He reports starting on new BP meds recently.  PSA level 0.22 on 4/30/25.  He continues to follow up with Dr. Martin.  Dr. Valentin evaluated patient.  He will follow up as needed.    Patient Pain Score:  0        Emma Medrano RN